# Patient Record
Sex: FEMALE | Race: WHITE | Employment: OTHER | URBAN - METROPOLITAN AREA
[De-identification: names, ages, dates, MRNs, and addresses within clinical notes are randomized per-mention and may not be internally consistent; named-entity substitution may affect disease eponyms.]

---

## 2017-02-10 ENCOUNTER — TELEPHONE (OUTPATIENT)
Dept: FAMILY MEDICINE CLINIC | Age: 52
End: 2017-02-10

## 2017-02-10 RX ORDER — PROMETHAZINE HYDROCHLORIDE AND CODEINE PHOSPHATE 6.25; 1 MG/5ML; MG/5ML
10 SYRUP ORAL 4 TIMES DAILY PRN
Qty: 150 ML | Refills: 0 | Status: SHIPPED | OUTPATIENT
Start: 2017-02-10 | End: 2017-03-15 | Stop reason: ALTCHOICE

## 2017-02-10 RX ORDER — AZITHROMYCIN 250 MG/1
TABLET, FILM COATED ORAL
Qty: 1 PACKET | Refills: 0 | Status: SHIPPED | OUTPATIENT
Start: 2017-02-10 | End: 2017-02-20

## 2017-09-05 ENCOUNTER — HOSPITAL ENCOUNTER (OUTPATIENT)
Dept: WOMENS IMAGING | Age: 52
Discharge: HOME | End: 2017-09-05
Payer: COMMERCIAL

## 2017-09-05 DIAGNOSIS — Z12.31 VISIT FOR SCREENING MAMMOGRAM: ICD-10-CM

## 2017-09-05 PROCEDURE — 77063 BREAST TOMOSYNTHESIS BI: CPT

## 2017-09-07 ENCOUNTER — OFFICE VISIT (OUTPATIENT)
Dept: FAMILY MEDICINE CLINIC | Age: 52
End: 2017-09-07
Payer: COMMERCIAL

## 2017-09-07 VITALS
BODY MASS INDEX: 30.37 KG/M2 | RESPIRATION RATE: 16 BRPM | DIASTOLIC BLOOD PRESSURE: 78 MMHG | TEMPERATURE: 98 F | SYSTOLIC BLOOD PRESSURE: 114 MMHG | WEIGHT: 189 LBS | HEIGHT: 66 IN | HEART RATE: 86 BPM | OXYGEN SATURATION: 97 %

## 2017-09-07 DIAGNOSIS — R14.0 BLOATING: ICD-10-CM

## 2017-09-07 DIAGNOSIS — R92.8 ABNORMAL MAMMOGRAM OF BOTH BREASTS: Primary | ICD-10-CM

## 2017-09-07 DIAGNOSIS — R53.83 TIRED: ICD-10-CM

## 2017-09-07 DIAGNOSIS — E03.4 HYPOTHYROIDISM DUE TO ACQUIRED ATROPHY OF THYROID: ICD-10-CM

## 2017-09-07 DIAGNOSIS — N95.1 MENOPAUSAL SYMPTOM: ICD-10-CM

## 2017-09-07 DIAGNOSIS — R79.89 PROLACTIN INCREASED: ICD-10-CM

## 2017-09-07 PROCEDURE — G8417 CALC BMI ABV UP PARAM F/U: HCPCS | Performed by: FAMILY MEDICINE

## 2017-09-07 PROCEDURE — 3017F COLORECTAL CA SCREEN DOC REV: CPT | Performed by: FAMILY MEDICINE

## 2017-09-07 PROCEDURE — 1036F TOBACCO NON-USER: CPT | Performed by: FAMILY MEDICINE

## 2017-09-07 PROCEDURE — 3014F SCREEN MAMMO DOC REV: CPT | Performed by: FAMILY MEDICINE

## 2017-09-07 PROCEDURE — G8427 DOCREV CUR MEDS BY ELIG CLIN: HCPCS | Performed by: FAMILY MEDICINE

## 2017-09-07 PROCEDURE — 99214 OFFICE O/P EST MOD 30 MIN: CPT | Performed by: FAMILY MEDICINE

## 2017-09-07 ASSESSMENT — PATIENT HEALTH QUESTIONNAIRE - PHQ9
2. FEELING DOWN, DEPRESSED OR HOPELESS: 0
1. LITTLE INTEREST OR PLEASURE IN DOING THINGS: 0
SUM OF ALL RESPONSES TO PHQ9 QUESTIONS 1 & 2: 0
SUM OF ALL RESPONSES TO PHQ QUESTIONS 1-9: 0

## 2017-09-08 ENCOUNTER — HOSPITAL ENCOUNTER (OUTPATIENT)
Age: 52
Discharge: HOME | End: 2017-09-08
Payer: COMMERCIAL

## 2017-09-08 ENCOUNTER — HOSPITAL ENCOUNTER (OUTPATIENT)
Dept: WOMENS IMAGING | Age: 52
Discharge: HOME | End: 2017-09-08
Payer: COMMERCIAL

## 2017-09-08 DIAGNOSIS — R14.0 BLOATING: ICD-10-CM

## 2017-09-08 DIAGNOSIS — E03.4 HYPOTHYROIDISM DUE TO ACQUIRED ATROPHY OF THYROID: ICD-10-CM

## 2017-09-08 DIAGNOSIS — R53.83 TIRED: ICD-10-CM

## 2017-09-08 DIAGNOSIS — R92.8 ABNORMAL MAMMOGRAM OF BOTH BREASTS: ICD-10-CM

## 2017-09-08 DIAGNOSIS — R79.89 PROLACTIN INCREASED: ICD-10-CM

## 2017-09-08 DIAGNOSIS — N63.0 LUMP, BREAST: ICD-10-CM

## 2017-09-08 DIAGNOSIS — N64.59 THICKENING OF SKIN OF BREAST: ICD-10-CM

## 2017-09-08 DIAGNOSIS — N95.1 MENOPAUSAL SYMPTOM: ICD-10-CM

## 2017-09-08 DIAGNOSIS — N64.9 ABNORMAL NIPPLE: ICD-10-CM

## 2017-09-08 LAB
ALBUMIN SERPL-MCNC: 4.8 G/DL (ref 3.5–5.1)
ALP BLD-CCNC: 96 U/L (ref 38–126)
ALT SERPL-CCNC: 26 U/L (ref 11–66)
ANION GAP SERPL CALCULATED.3IONS-SCNC: 12 MEQ/L (ref 8–16)
AST SERPL-CCNC: 14 U/L (ref 5–40)
AVERAGE GLUCOSE: 99 MG/DL (ref 70–126)
BASOPHILS # BLD: 1 %
BASOPHILS ABSOLUTE: 0.1 THOU/MM3 (ref 0–0.1)
BILIRUB SERPL-MCNC: 0.4 MG/DL (ref 0.3–1.2)
BILIRUBIN DIRECT: < 0.2 MG/DL (ref 0–0.3)
BUN BLDV-MCNC: 15 MG/DL (ref 7–22)
CALCIUM SERPL-MCNC: 10.3 MG/DL (ref 8.5–10.5)
CHLORIDE BLD-SCNC: 107 MEQ/L (ref 98–111)
CHOLESTEROL, TOTAL: 210 MG/DL (ref 100–199)
CO2: 24 MEQ/L (ref 23–33)
CREAT SERPL-MCNC: 0.7 MG/DL (ref 0.4–1.2)
EOSINOPHIL # BLD: 0.7 %
EOSINOPHILS ABSOLUTE: 0 THOU/MM3 (ref 0–0.4)
ESTRADIOL LEVEL: < 5 PG/ML
FOLLICLE STIMULATING HORMONE: 53 MIU/ML (ref 16–160)
GFR SERPL CREATININE-BSD FRML MDRD: 87 ML/MIN/1.73M2
GLUCOSE BLD-MCNC: 96 MG/DL (ref 70–108)
HBA1C MFR BLD: 5.3 % (ref 4.4–6.4)
HCT VFR BLD CALC: 42.8 % (ref 37–47)
HDLC SERPL-MCNC: 58 MG/DL
HEMOGLOBIN: 14.5 GM/DL (ref 12–16)
LDL CHOLESTEROL CALCULATED: 133 MG/DL
LUTEINIZING HORMONE: 18.8 MIU/ML (ref 3.3–70.6)
LYMPHOCYTES # BLD: 27.6 %
LYMPHOCYTES ABSOLUTE: 1.7 THOU/MM3 (ref 1–4.8)
MAGNESIUM: 1.9 MG/DL (ref 1.6–2.4)
MCH RBC QN AUTO: 30.3 PG (ref 27–31)
MCHC RBC AUTO-ENTMCNC: 33.9 GM/DL (ref 33–37)
MCV RBC AUTO: 89.4 FL (ref 81–99)
MONOCYTES # BLD: 6.6 %
MONOCYTES ABSOLUTE: 0.4 THOU/MM3 (ref 0.4–1.3)
NUCLEATED RED BLOOD CELLS: 0 /100 WBC
PDW BLD-RTO: 13.3 % (ref 11.5–14.5)
PLATELET # BLD: 288 THOU/MM3 (ref 130–400)
PMV BLD AUTO: 8.5 MCM (ref 7.4–10.4)
POTASSIUM SERPL-SCNC: 4 MEQ/L (ref 3.5–5.2)
PROGESTERONE LEVEL: < 0.05 NG/ML
PROLACTIN: 95.8 NG/ML
PTH INTACT: 20.6 PG/ML (ref 15–65)
RBC # BLD: 4.79 MILL/MM3 (ref 4.2–5.4)
RBC # BLD: NORMAL 10*6/UL
RHEUMATOID FACTOR: < 10 IU/ML (ref 0–13)
SEDIMENTATION RATE, ERYTHROCYTE: 12 MM/HR (ref 0–20)
SEG NEUTROPHILS: 64.1 %
SEGMENTED NEUTROPHILS ABSOLUTE COUNT: 3.9 THOU/MM3 (ref 1.8–7.7)
SODIUM BLD-SCNC: 143 MEQ/L (ref 135–145)
T3 TOTAL: 177 NG/DL (ref 72–181)
THYROXINE (T4): 7.1 UG/DL (ref 4.5–12)
TOTAL PROTEIN: 7.8 G/DL (ref 6.1–8)
TRIGL SERPL-MCNC: 93 MG/DL (ref 0–199)
TSH SERPL DL<=0.05 MIU/L-ACNC: 0.76 UIU/ML (ref 0.4–4.2)
URIC ACID: 4.8 MG/DL (ref 2.4–5.7)
VITAMIN D 25-HYDROXY: 82 NG/ML (ref 30–100)
WBC # BLD: 6.1 THOU/MM3 (ref 4.8–10.8)

## 2017-09-08 PROCEDURE — 84550 ASSAY OF BLOOD/URIC ACID: CPT

## 2017-09-08 PROCEDURE — 83002 ASSAY OF GONADOTROPIN (LH): CPT

## 2017-09-08 PROCEDURE — 86038 ANTINUCLEAR ANTIBODIES: CPT

## 2017-09-08 PROCEDURE — 82627 DEHYDROEPIANDROSTERONE: CPT

## 2017-09-08 PROCEDURE — 83090 ASSAY OF HOMOCYSTEINE: CPT

## 2017-09-08 PROCEDURE — 86430 RHEUMATOID FACTOR TEST QUAL: CPT

## 2017-09-08 PROCEDURE — 84270 ASSAY OF SEX HORMONE GLOBUL: CPT

## 2017-09-08 PROCEDURE — 36415 COLL VENOUS BLD VENIPUNCTURE: CPT

## 2017-09-08 PROCEDURE — 83735 ASSAY OF MAGNESIUM: CPT

## 2017-09-08 PROCEDURE — 83970 ASSAY OF PARATHORMONE: CPT

## 2017-09-08 PROCEDURE — 82670 ASSAY OF TOTAL ESTRADIOL: CPT

## 2017-09-08 PROCEDURE — 80053 COMPREHEN METABOLIC PANEL: CPT

## 2017-09-08 PROCEDURE — 86376 MICROSOMAL ANTIBODY EACH: CPT

## 2017-09-08 PROCEDURE — 82248 BILIRUBIN DIRECT: CPT

## 2017-09-08 PROCEDURE — 82306 VITAMIN D 25 HYDROXY: CPT

## 2017-09-08 PROCEDURE — 84436 ASSAY OF TOTAL THYROXINE: CPT

## 2017-09-08 PROCEDURE — 85025 COMPLETE CBC W/AUTO DIFF WBC: CPT

## 2017-09-08 PROCEDURE — 83036 HEMOGLOBIN GLYCOSYLATED A1C: CPT

## 2017-09-08 PROCEDURE — 84443 ASSAY THYROID STIM HORMONE: CPT

## 2017-09-08 PROCEDURE — 82626 DEHYDROEPIANDROSTERONE: CPT

## 2017-09-08 PROCEDURE — 85651 RBC SED RATE NONAUTOMATED: CPT

## 2017-09-08 PROCEDURE — 84146 ASSAY OF PROLACTIN: CPT

## 2017-09-08 PROCEDURE — 84144 ASSAY OF PROGESTERONE: CPT

## 2017-09-08 PROCEDURE — 84480 ASSAY TRIIODOTHYRONINE (T3): CPT

## 2017-09-08 PROCEDURE — 86141 C-REACTIVE PROTEIN HS: CPT

## 2017-09-08 PROCEDURE — 80061 LIPID PANEL: CPT

## 2017-09-08 PROCEDURE — 83516 IMMUNOASSAY NONANTIBODY: CPT

## 2017-09-08 PROCEDURE — 84403 ASSAY OF TOTAL TESTOSTERONE: CPT

## 2017-09-08 PROCEDURE — 76642 ULTRASOUND BREAST LIMITED: CPT

## 2017-09-08 PROCEDURE — 83001 ASSAY OF GONADOTROPIN (FSH): CPT

## 2017-09-09 LAB
C-REACTIVE PROTEIN, HIGH SENSITIVITY: 2.7 MG/L
DHEAS (DHEA SULFATE): 341 UG/DL (ref 26–200)
HOMOCYSTEINE, TOTAL: 8 UMOL/L
THYROID PEROXIDASE ANTIBODY: 0.4 IU/ML (ref 0–9)

## 2017-09-10 LAB
ANA SCREEN: NORMAL
GLIADIN PEPTIDE IGG, IGA: NORMAL

## 2017-09-11 ENCOUNTER — PATIENT MESSAGE (OUTPATIENT)
Dept: FAMILY MEDICINE CLINIC | Age: 52
End: 2017-09-11

## 2017-09-11 DIAGNOSIS — R92.8 ABNORMAL MAMMOGRAM OF BOTH BREASTS: Primary | ICD-10-CM

## 2017-09-12 LAB
DHEA UNCONJUGATED: 8.43 NG/ML (ref 0.63–4.7)
TESTOSTERONE, FREE W SHGB, FEMALES/CHILDREN: NORMAL

## 2017-09-18 ENCOUNTER — OFFICE VISIT (OUTPATIENT)
Dept: FAMILY MEDICINE CLINIC | Age: 52
End: 2017-09-18
Payer: COMMERCIAL

## 2017-09-18 VITALS
SYSTOLIC BLOOD PRESSURE: 136 MMHG | HEART RATE: 86 BPM | RESPIRATION RATE: 12 BRPM | HEIGHT: 67 IN | DIASTOLIC BLOOD PRESSURE: 82 MMHG | WEIGHT: 185 LBS | BODY MASS INDEX: 29.03 KG/M2 | OXYGEN SATURATION: 98 %

## 2017-09-18 DIAGNOSIS — R53.83 TIRED: ICD-10-CM

## 2017-09-18 DIAGNOSIS — R14.0 BLOATING: ICD-10-CM

## 2017-09-18 DIAGNOSIS — M54.40 ACUTE MIDLINE LOW BACK PAIN WITH SCIATICA, SCIATICA LATERALITY UNSPECIFIED: ICD-10-CM

## 2017-09-18 DIAGNOSIS — R63.5 WEIGHT INCREASE: ICD-10-CM

## 2017-09-18 DIAGNOSIS — E03.4 HYPOTHYROIDISM DUE TO ACQUIRED ATROPHY OF THYROID: ICD-10-CM

## 2017-09-18 DIAGNOSIS — H53.9 VISION CHANGES: Primary | ICD-10-CM

## 2017-09-18 DIAGNOSIS — R79.89 PROLACTIN INCREASED: ICD-10-CM

## 2017-09-18 DIAGNOSIS — R92.8 ABNORMAL MAMMOGRAM OF BOTH BREASTS: ICD-10-CM

## 2017-09-18 DIAGNOSIS — N95.1 MENOPAUSAL SYMPTOM: ICD-10-CM

## 2017-09-18 PROCEDURE — G8427 DOCREV CUR MEDS BY ELIG CLIN: HCPCS | Performed by: FAMILY MEDICINE

## 2017-09-18 PROCEDURE — 1036F TOBACCO NON-USER: CPT | Performed by: FAMILY MEDICINE

## 2017-09-18 PROCEDURE — 3014F SCREEN MAMMO DOC REV: CPT | Performed by: FAMILY MEDICINE

## 2017-09-18 PROCEDURE — 3017F COLORECTAL CA SCREEN DOC REV: CPT | Performed by: FAMILY MEDICINE

## 2017-09-18 PROCEDURE — G8417 CALC BMI ABV UP PARAM F/U: HCPCS | Performed by: FAMILY MEDICINE

## 2017-09-18 PROCEDURE — 99214 OFFICE O/P EST MOD 30 MIN: CPT | Performed by: FAMILY MEDICINE

## 2017-09-18 RX ORDER — MAGNESIUM GLUCONATE 27 MG(500)
500 TABLET ORAL 2 TIMES DAILY
COMMUNITY
End: 2020-06-18

## 2017-09-20 ENCOUNTER — HOSPITAL ENCOUNTER (OUTPATIENT)
Dept: CT IMAGING | Age: 52
Discharge: HOME | End: 2017-09-20
Payer: COMMERCIAL

## 2017-09-20 DIAGNOSIS — R79.89 PROLACTIN INCREASED: ICD-10-CM

## 2017-09-20 DIAGNOSIS — H53.9 VISION CHANGES: ICD-10-CM

## 2017-09-20 PROCEDURE — 70450 CT HEAD/BRAIN W/O DYE: CPT

## 2017-09-25 ENCOUNTER — OFFICE VISIT (OUTPATIENT)
Dept: ONCOLOGY | Age: 52
End: 2017-09-25
Payer: COMMERCIAL

## 2017-09-25 ENCOUNTER — HOSPITAL ENCOUNTER (OUTPATIENT)
Dept: INFUSION THERAPY | Age: 52
Discharge: HOME | End: 2017-09-25
Payer: COMMERCIAL

## 2017-09-25 VITALS
DIASTOLIC BLOOD PRESSURE: 86 MMHG | RESPIRATION RATE: 18 BRPM | HEIGHT: 67 IN | BODY MASS INDEX: 29.26 KG/M2 | SYSTOLIC BLOOD PRESSURE: 134 MMHG | WEIGHT: 186.4 LBS | OXYGEN SATURATION: 96 % | HEART RATE: 79 BPM | TEMPERATURE: 97.1 F

## 2017-09-25 DIAGNOSIS — R92.8 ABNORMAL MAMMOGRAM OF BOTH BREASTS: Primary | ICD-10-CM

## 2017-09-25 PROCEDURE — 99211 OFF/OP EST MAY X REQ PHY/QHP: CPT

## 2017-09-25 PROCEDURE — 99203 OFFICE O/P NEW LOW 30 MIN: CPT | Performed by: INTERNAL MEDICINE

## 2017-09-25 PROCEDURE — G8417 CALC BMI ABV UP PARAM F/U: HCPCS | Performed by: INTERNAL MEDICINE

## 2017-09-25 PROCEDURE — G8427 DOCREV CUR MEDS BY ELIG CLIN: HCPCS | Performed by: INTERNAL MEDICINE

## 2017-09-25 PROCEDURE — 3017F COLORECTAL CA SCREEN DOC REV: CPT | Performed by: INTERNAL MEDICINE

## 2017-09-25 PROCEDURE — 1036F TOBACCO NON-USER: CPT | Performed by: INTERNAL MEDICINE

## 2017-09-25 PROCEDURE — 3014F SCREEN MAMMO DOC REV: CPT | Performed by: INTERNAL MEDICINE

## 2017-09-25 NOTE — MR AVS SNAPSHOT
After Visit Summary             Win Humphrey   2017 2:30 PM   Office Visit    Description:  Female : 1965   Provider:  Omkar Choi MD   Department:  [facility] [address] [place] [address] and Future Appointments         Below is a list of your follow-up and future appointments. This may not be a complete list as you may have made appointments directly with providers that we are not aware of or your providers may have made some for you. Please call your providers to confirm appointments. It is important to keep your appointments. Please bring your current insurance card, photo ID, co-pay, and all medication bottles to your appointment. If self-pay, payment is expected at the time of service. Your To-Do List     Future Appointments Provider Department Dept Phone    10/27/2017 8:40 AM Larissa Mora MD [facility] [phone]    Please arrive 15 minutes prior to appointment, bring photo ID and insurance card. Please arrive 15 minutes prior to appointment, bring photo ID and insurance card. 3/27/2018 11:15 AM Omkar Choi MD [facility] [phone]    Please arrive 15 minutes prior to appointment, bring photo ID and insurance card. Please arrive 15 minutes prior to appointment, bring photo ID and insurance card. Follow-Up    Return in about 6 months (around 3/27/2018).          Information from Your Visit        Department     Name Address Phone Fax    [facility] [address] [place] [address] [address]  [address]  [facility] [phone]      Vital Signs     Blood Pressure Pulse Temperature Respirations Height Weight    134/86 (Site: Left Arm, Position: Sitting, Cuff Size: Medium Adult) 79 97.1 °F (36.2 °C) (Oral) 18 5' 6.53\" (1.69 m) 186 lb 6.4 oz (84.6 kg)    Oxygen Saturation Body Mass Index Smoking Status             96% 29.6 kg/m2 Former Smoker view the After Visit Summary. Additional Information  If you have questions, please contact the physician practice where you receive care. Remember, [employer] is NOT to be used for urgent needs. For medical emergencies, dial 911. For questions regarding your [employer] account call [phone]. If you have a clinical question, please call your doctor's office.

## 2017-09-25 NOTE — PROGRESS NOTES
[facility] PROFESSIONAL SERVS  [facility]  [address]  [address] [address]  [address] [address]  Dept: [phone]  Dept Fax: [phone]: [phone]     Visit Date: 9/25/2017     Stephanie Vizcaino is a 45 y.o. female who presents today for:   Chief Complaint   Patient presents with    Established New Doctor     Abnormal Mammogram        HPI:     This is a 66-year-old patient referred to medical oncology clinic to review and discuss findings from her mammogram.  Patient noticed some inversion of her left nipple associated with skin thickening around the left nipple. Therefore she had to the mammogram on September 5, 2017 that showed: There is some left nipple retraction and skin thickening at the    left nipple noted with no definite mammographic correlate. A    retroareolar left breast diagnostic ultrasound is recommended.     The mass in the left breast needs additional evaluation. On September 11, 2017 she had left a breast ultrasound that showed no sonographic correlate for nipple inversion. No definite skin thickening was seen on ultrasound. The overall report was read as BI-RADS Category 3, probably benign and six-month follow up of the left breast mammogram was recommended to show stability.       HPI   Past Medical History:   Diagnosis Date    ADHD (attention deficit hyperactivity disorder)     Hypothyroid 2006    Dr. Julius Mondragon Prolactin increased [facility]) 2006    Dr. Verner Seals      Past Surgical History:   Procedure Laterality Date   [address] then 1979    right then left      Family History   Problem Relation Age of Onset    Early Death Brother 39     suicide    Diabetes Maternal Grandmother       Social History   Substance Use Topics    Smoking status: Former Smoker     Years: 7.00     Types: Cigarettes    Smokeless tobacco: Never Used    Alcohol use No      Current Outpatient Prescriptions   Medication Sig Dispense Refill    magnesium gluconate (MAGONATE) 500 MG tablet Take 500 mg by mouth 2 times daily      B Complex-Folic Acid (BALANCED E-64 TR PO) Take 1 tablet by mouth 3 times daily (before meals) [facility] or Carlos at [facility] must be time released and not have Vit C in the pill      ascorbic acid (VITAMIN C) 1000 MG tablet Take 1,000 mg by mouth 4 times daily (before meals and nightly)      Lysine 500 MG TABS Take 1 tablet by mouth 4 times daily (before meals and nightly)      Menaquinone-7 (VITAMIN K2 PO) Take 1 capsule by mouth 2 times daily (before meals)      NATURE-THROID 65 MG tablet take 1 tablet by mouth once daily 30 tablet 11    b complex vitamins capsule Take 1 capsule by mouth daily B-12 plus by Delilah Shields at  [address] (OMEGA 3 500 PO) Take 1 capsule by mouth 4 times daily (before meals and nightly) [facility] # [id number]      Cholecalciferol (VITAMIN D PO) Take 5,000 Int'l Units by mouth daily       aspirin 325 MG EC tablet Take 325 mg by mouth daily      Cinnamon 500 MG CAPS Take 1 capsule by mouth 4 times daily (before meals and nightly) If want weight loss      Misc Natural Products (OSTEO BI-FLEX TRIPLE STRENGTH PO) Take 1 tablet by mouth daily        No current facility-administered medications for this visit. No Known Allergies   Health Maintenance   Topic Date Due    Hepatitis C screen  1965    HIV screen  03/11/1978    DTaP/Tdap/Td vaccine (1 - Tdap) 03/11/1982    Flu vaccine (1) 09/01/2017    Cervical cancer screen  03/16/2019    Breast cancer screen  09/05/2019    Lipid screen  09/08/2022    Colon cancer screen colonoscopy  03/16/2026        Subjective:   Review of Systems   Constitutional: Positive for fatigue. Negative for activity change, appetite change and fever. HENT: Negative for congestion, dental problem, facial swelling, hearing loss, mouth sores, nosebleeds, sore throat, tinnitus and trouble swallowing. Eyes: Positive for visual disturbance. Negative for discharge. Respiratory: Negative for cough, chest tightness, shortness of breath and wheezing. Cardiovascular: Negative for chest pain, palpitations and leg swelling. Gastrointestinal: Negative for abdominal distention, abdominal pain, blood in stool, constipation, diarrhea, nausea, rectal pain and vomiting. Endocrine: Negative for cold intolerance, polydipsia and polyuria. Genitourinary: Negative for decreased urine volume, difficulty urinating, dysuria, flank pain, hematuria and urgency. Musculoskeletal: Negative for arthralgias, back pain, gait problem, joint swelling, myalgias and neck stiffness. Skin: Negative for color change, rash and wound. Neurological: Positive for dizziness and headaches. Negative for tremors, seizures, speech difficulty, weakness, light-headedness and numbness. Hematological: Negative for adenopathy. Does not bruise/bleed easily. Psychiatric/Behavioral: Negative for confusion and sleep disturbance. The patient is nervous/anxious. Objective:   Physical Exam   Constitutional: She is oriented to person, place, and time. She appears well-developed and well-nourished. No distress. HENT:   Head: Normocephalic. Mouth/Throat: Oropharynx is clear and moist. No oropharyngeal exudate. Eyes: EOM are normal. Pupils are equal, round, and reactive to light. Right eye exhibits no discharge. Left eye exhibits no discharge. No scleral icterus. Neck: Normal range of motion. Neck supple. No JVD present. No tracheal deviation present. No thyromegaly present. Cardiovascular: Normal rate and normal heart sounds. Exam reveals no gallop and no friction rub. No murmur heard. Pulmonary/Chest: Effort normal and breath sounds normal. No stridor. No respiratory distress. She has no wheezes. She has no rales. She exhibits no tenderness. Left breast exhibits inverted nipple. Left breast exhibits no mass, no nipple discharge, no skin change and no tenderness. electronically signed by Dr. Shirley Laura on 9/21/2017 11:05 AM    [facility] Digital Screening Self Referral    Result Date: 9/5/2017  IMPRESSION: Mammogram BI-RADS: 0: Incomplete There is some left nipple retraction and skin thickening at the left nipple noted with no definite mammographic correlate. A retroareolar left breast diagnostic ultrasound is recommended. The mass in the left breast needs additional evaluation. The patient has been or will be contacted. #[id number] - [facility] DIGITAL SCREENING SELF REFERRAL BILATERAL DIGITAL SCREENING MAMMOGRAM 3D/2D WITH CAD: 9/5/2017 CLINICAL: Tomosynthesis. Self referred screening mammogram.  Comparison is made to exams dated:  3/22/2016 mammogram and 9/2/2005 mammogram - [address] [address]. The tissue of both breasts is heterogeneously dense. This may lower the sensitivity of mammography. Current study was also evaluated with a Computer Aided Detection (CAD) system. There is some left nipple retraction and skin thickening at the left nipple noted with no definite mammographic correlate. There is a mass in the left breast central to the nipple in the retroareolar region. No other significant masses, calcifications, or other findings are seen in either breast.  Courtney Tirvedi M.D., rd/penrad:9/5/2017 17:06:33  Imaging Technologist: Samir Salomon RT(R)(M), [address] [place] [address] letter sent: Additional Tests Needed  [id number]     Us Left Breast Limited    Result Date: 9/8/2017  IMPRESSION: Ultrasound BI-RADS: 3: Probably Benign There is no sonographic correlate for nipple inversion. No definite skin thickening is seen by ultrasound. Recommend 6 month  follow up left breast mammogram to document stability. A follow-up left mammogram in 6 months is recommended to demonstrate stability. The patient has been or will be contacted. #[id number] - US LEFT BREAST LIMITED ULTRASOUND OF LEFT BREAST AND LEFT AXILLA: 9/8/2017 CLINICAL: Lt breast nipple retraction. Left breast mass. Comparison is made to exams dated:  9/5/2017 mammogram and 3/22/2016 mammogram - [facility]. Ultrasound of the left breast and axilla was performed. Gray scale images of the real-time examination were reviewed. No abnormalities were seen sonographically in the left axilla. There are no discrete cystic or solid masses present to correspond to the nipple inversion. Silvio Coreas M.D.  rd/penrad:9/8/2017 11:22:35  copy to: Denise Higuera M.D., [address], ph: [phone], fax: [phone] Imaging Technologist: Stephania Haley RT(R)(M), [address] [address] letter sent: 6 Month Follow up Recommended     [id number]       Assessment/Plan:   1. Left breast and nipple inversion. The nipple inversion is very subtle on the physical examination. No drainage, no skin changes around the nipple. The patient had left breast mammogram and ultrasound they were read as BI-RADS Category 3. No sonographic correlate with the nipple inversion. Recommendation is to proceed with 6 months follow-up of the left breast mammogram.  It will be done in March 2018, I will see the patient again few days after mammogram.  She was instructed to call us if her nipple inversion is progressing and she develops some skin changes or swelling around the nipple. No diagnosis found. Plan:   No Follow-up on file. Orders Placed:   No orders of the defined types were placed in this encounter. Medications Prescribed:   No orders of the defined types were placed in this encounter. Discussed use, benefit, and side effects of prescribed medications. All patient questions answered. Pt voiced understanding. Instructed to continue current medications, diet and exercise. Patient agreed with treatment plan. Follow up as directed.     Electronically signed by Allan Eisenmenger, MD on 9/25/17 at 2:44 PM

## 2017-09-25 NOTE — PATIENT INSTRUCTIONS
1.  The patient will have 6 months follow-up breast  mammogram beginning of March 2018  2.    I should see her and March 2018

## 2017-10-06 DIAGNOSIS — E03.4 HYPOTHYROIDISM DUE TO ACQUIRED ATROPHY OF THYROID: ICD-10-CM

## 2017-10-06 DIAGNOSIS — R53.82 CHRONIC FATIGUE: ICD-10-CM

## 2017-10-09 RX ORDER — THYROID 60 MG/1
TABLET ORAL
Qty: 30 TABLET | Refills: 11 | Status: SHIPPED | OUTPATIENT
Start: 2017-10-09 | End: 2018-03-14 | Stop reason: RX

## 2017-10-16 ENCOUNTER — TELEPHONE (OUTPATIENT)
Dept: FAMILY MEDICINE CLINIC | Age: 52
End: 2017-10-16

## 2017-10-16 DIAGNOSIS — E03.4 HYPOTHYROIDISM DUE TO ACQUIRED ATROPHY OF THYROID: Primary | ICD-10-CM

## 2017-10-19 ASSESSMENT — ENCOUNTER SYMPTOMS
ABDOMINAL DISTENTION: 0
CHEST TIGHTNESS: 0
TROUBLE SWALLOWING: 0
ABDOMINAL PAIN: 0
SORE THROAT: 0
VOMITING: 0
DIARRHEA: 0
BLOOD IN STOOL: 0
SHORTNESS OF BREATH: 0
CONSTIPATION: 0
FACIAL SWELLING: 0
RECTAL PAIN: 0
COUGH: 0
EYE DISCHARGE: 0
NAUSEA: 0
BACK PAIN: 0
WHEEZING: 0
COLOR CHANGE: 0

## 2017-10-24 RX ORDER — LEVOTHYROXINE SODIUM 0.1 MG/1
100 TABLET ORAL DAILY
Qty: 30 TABLET | Refills: 1 | Status: SHIPPED | OUTPATIENT
Start: 2017-10-24 | End: 2018-01-16 | Stop reason: SDUPTHER

## 2018-01-16 DIAGNOSIS — E03.4 HYPOTHYROIDISM DUE TO ACQUIRED ATROPHY OF THYROID: ICD-10-CM

## 2018-01-16 RX ORDER — LEVOTHYROXINE SODIUM 0.1 MG/1
100 TABLET ORAL DAILY
Qty: 30 TABLET | Refills: 0 | Status: SHIPPED | OUTPATIENT
Start: 2018-01-16 | End: 2018-03-27 | Stop reason: ALTCHOICE

## 2018-03-08 ENCOUNTER — HOSPITAL ENCOUNTER (OUTPATIENT)
Age: 53
Discharge: HOME | End: 2018-03-08
Payer: COMMERCIAL

## 2018-03-08 DIAGNOSIS — E03.4 HYPOTHYROIDISM DUE TO ACQUIRED ATROPHY OF THYROID: ICD-10-CM

## 2018-03-08 LAB — TSH SERPL DL<=0.05 MIU/L-ACNC: 0.85 UIU/ML (ref 0.4–4.2)

## 2018-03-08 PROCEDURE — 84443 ASSAY THYROID STIM HORMONE: CPT

## 2018-03-08 PROCEDURE — 36415 COLL VENOUS BLD VENIPUNCTURE: CPT

## 2018-03-12 ENCOUNTER — TELEPHONE (OUTPATIENT)
Dept: FAMILY MEDICINE CLINIC | Age: 53
End: 2018-03-12

## 2018-03-12 NOTE — TELEPHONE ENCOUNTER
----- Message from Gifty Meléndez MD sent at 3/11/2018  4:50 PM EDT -----  Let her know the TSH was fine. She has not seen me since 2015. I would like to see her this month still.

## 2018-03-14 ENCOUNTER — OFFICE VISIT (OUTPATIENT)
Dept: FAMILY MEDICINE CLINIC | Age: 53
End: 2018-03-14

## 2018-03-14 VITALS
DIASTOLIC BLOOD PRESSURE: 60 MMHG | SYSTOLIC BLOOD PRESSURE: 104 MMHG | HEART RATE: 88 BPM | WEIGHT: 183.25 LBS | BODY MASS INDEX: 31.28 KG/M2 | HEIGHT: 64 IN | RESPIRATION RATE: 16 BRPM

## 2018-03-14 DIAGNOSIS — E03.4 HYPOTHYROIDISM DUE TO ACQUIRED ATROPHY OF THYROID: Primary | ICD-10-CM

## 2018-03-14 PROCEDURE — 99213 OFFICE O/P EST LOW 20 MIN: CPT | Performed by: FAMILY MEDICINE

## 2018-03-14 RX ORDER — LEVOTHYROXINE AND LIOTHYRONINE 38; 9 UG/1; UG/1
60 TABLET ORAL DAILY
Qty: 30 TABLET | Refills: 3 | Status: SHIPPED | OUTPATIENT
Start: 2018-03-14 | End: 2018-07-09 | Stop reason: SDUPTHER

## 2018-03-14 ASSESSMENT — ENCOUNTER SYMPTOMS
CONSTIPATION: 0
SINUS PRESSURE: 0
SHORTNESS OF BREATH: 0

## 2018-03-14 NOTE — PROGRESS NOTES
Subjective:      Patient ID: Sande Lanes is a 52 y.o. female. HPI  1. She wonders about armor thyroid. 2. She doesn't feel any energy on the levothyroxine. 3. She had some hot flashes  4. She was on ritalin in the past and wonders about it again. I asked her to wait till she's been on the armor for a time. Review of Systems   Constitutional: Positive for fatigue. HENT: Negative for sinus pressure. Eyes: Negative for visual disturbance. Respiratory: Negative for shortness of breath. Cardiovascular: Negative for chest pain. Gastrointestinal: Negative for constipation. Genitourinary: Negative. Hot flashes   Musculoskeletal: Negative for arthralgias. Skin: Negative for rash. Neurological: Positive for weakness. Negative for headaches. Psychiatric/Behavioral: Positive for sleep disturbance. The patient's medications, allergies, past medical problems, surgical, social, and family histories were reviewed and updated as needed. Objective:   Physical Exam   Constitutional: She is oriented to person, place, and time. She appears well-developed and well-nourished. No distress. HENT:   Head: Normocephalic and atraumatic. Right Ear: External ear normal.   Left Ear: External ear normal.   Nose: Nose normal.   Mouth/Throat: Oropharynx is clear and moist. No oropharyngeal exudate. Eyes: Conjunctivae are normal. No scleral icterus. Neck: Neck supple. Carotid bruit is not present. No tracheal deviation present. No thyromegaly present. Cardiovascular: Normal rate, regular rhythm, normal heart sounds and intact distal pulses. Pulmonary/Chest: Effort normal and breath sounds normal.   Abdominal: Soft. Bowel sounds are normal. She exhibits no mass. Musculoskeletal: She exhibits no edema. Lymphadenopathy:     She has no cervical adenopathy. Neurological: She is alert and oriented to person, place, and time. Skin: Skin is warm and dry.    Psychiatric: She has a normal mood

## 2018-03-26 ENCOUNTER — HOSPITAL ENCOUNTER (OUTPATIENT)
Dept: WOMENS IMAGING | Age: 53
Discharge: HOME | End: 2018-03-26
Payer: COMMERCIAL

## 2018-03-26 DIAGNOSIS — N64.9 ABNORMAL NIPPLE: ICD-10-CM

## 2018-03-26 DIAGNOSIS — Z09 FOLLOW UP: ICD-10-CM

## 2018-03-26 PROCEDURE — G0279 TOMOSYNTHESIS, MAMMO: HCPCS

## 2018-03-26 PROCEDURE — 76642 ULTRASOUND BREAST LIMITED: CPT

## 2018-03-27 ENCOUNTER — OFFICE VISIT (OUTPATIENT)
Dept: ONCOLOGY | Age: 53
End: 2018-03-27
Payer: COMMERCIAL

## 2018-03-27 ENCOUNTER — HOSPITAL ENCOUNTER (OUTPATIENT)
Dept: INFUSION THERAPY | Age: 53
Discharge: HOME | End: 2018-03-27
Payer: COMMERCIAL

## 2018-03-27 VITALS
HEART RATE: 85 BPM | TEMPERATURE: 98.7 F | OXYGEN SATURATION: 96 % | BODY MASS INDEX: 31.21 KG/M2 | DIASTOLIC BLOOD PRESSURE: 83 MMHG | WEIGHT: 182.8 LBS | RESPIRATION RATE: 16 BRPM | SYSTOLIC BLOOD PRESSURE: 141 MMHG | HEIGHT: 64 IN

## 2018-03-27 DIAGNOSIS — R92.8 ABNORMAL MAMMOGRAM OF BOTH BREASTS: Primary | ICD-10-CM

## 2018-03-27 PROCEDURE — 99213 OFFICE O/P EST LOW 20 MIN: CPT | Performed by: PHYSICIAN ASSISTANT

## 2018-03-27 PROCEDURE — 99211 OFF/OP EST MAY X REQ PHY/QHP: CPT

## 2018-03-28 ASSESSMENT — ENCOUNTER SYMPTOMS
CONSTIPATION: 0
RHINORRHEA: 0
CHEST TIGHTNESS: 0
VOMITING: 0
COUGH: 0
WHEEZING: 0
NAUSEA: 0
SINUS PAIN: 0
ABDOMINAL PAIN: 0
DIARRHEA: 0
SORE THROAT: 0
SHORTNESS OF BREATH: 0

## 2018-03-28 NOTE — PROGRESS NOTES
[facility] [address] [facility] [address], [address] [facility],[address]  [address] [address]  Dept: [phone]  Dept Fax: [phone]: [phone]      Visit Date: 3/27/2018     Mirna Bennett is a 52 y.o. female who presents today for:   Chief Complaint   Patient presents with    Follow-up     ABNORMAL MAMMO        HPI:   David Velázquez is a 52year old female who was referred to medical oncology to review and discuss findings from mammogram. The patient was evaluated by Dr. Therese Farr. Per Dr. Rosalva Small consult note: Patient noticed some inversion of her left nipple associated with skin thickening around the left nipple. Therefore she had the mammogram on September 5, 2017 that showed: There is some left nipple retraction and skin thickening at the    left nipple noted with no definite mammographic correlate. A    retroareolar left breast diagnostic ultrasound is recommended.     The mass in the left breast needs additional evaluation.       On September 11, 2017 she had left a breast ultrasound that showed no sonographic correlate for nipple inversion. No definite skin thickening was seen on ultrasound. The overall report was read as BI-RADS Category 3, probably benign and six-month follow up of the left breast mammogram was recommended to show stability. Interval History 3/27/2018: The patient presents for follow-up evaluation today. She had a diagnostic left mammogram completed 3/26/18 that revealed no mammographic abnormality to explain the patient's nipple inversion. An ultrasound of the left breast on 3/26/18 showed no sonographic correlate for nipple inversion, no evidence of malignancy. Ductal ectasia appears benign. Patient states since her last visit she has not developed new bumps or lumps in either breast. She has continued left nipple inversion which is associated with pain during different times of the month. She denies any redness or discharge.      HPI   Past Medical History:   Diagnosis Date    to exams dated:  9/5/2017 mammogram, 3/22/2016    mammogram, and 9/2/2005 mammogram - [facility].         The tissue of the left breast is heterogeneously dense. This may    lower the sensitivity of mammography.     Current study was also evaluated with a Computer Aided Detection    (CAD) system.     No mammographic explanation for the patient's nipple inversion.         No significant masses, calcifications, or other findings are seen    in the breast.       Left Breast Ultrasound  Narrative       IMPRESSION: Ultrasound BI-RADS: 2: Benign   There is no sonographic correlate for nipple inversion.     There is no sonographic evidence of malignancy.     The duct ectasia in the left breast appears benign.     Return to annual mammogram screening schedule is recommended.     The patient was notified of the results.         #[id number] - US BREAST LIMITED LEFT   ULTRASOUND OF LEFT BREAST: 3/26/2018   CLINICAL: Lt breast abnormal nipple.         Comparison is made to exams dated:  3/26/2018 mammogram, 9/8/2017    ultrasound, 9/5/2017 mammogram, and 3/22/2016 mammogram - [address]     Ultrasound of the left breast was performed.  Gray scale images    of the real-time examination were reviewed.     There is benign appearing duct ectasia in the left breast central    to the nipple anterior depth.  This correlates as an incidental    finding.     There are no discrete cystic or solid masses present to    correspond to the nipple inversion.         Dulce Mortensen M.D.     rd/penrad:3/26/2018 10:39:27              Assessment & Plan:   1. Left Breast Nipple Inversion   -Inversion is noted on physical examination, although is subtle. No drainage or skin changes are noted. Left Breast mammogram completed 3/26/18 showed no mammographic abnormality. Left breast ultrasound revealed benign appearing duct ectasia in the left breast central to nipple anterior depth. No masses to correspond. -Reviewed with Dr. Cheryle Salles, patient will return to routine screening mammogram in 6 months. This should be completed September 2018. Patient states this is already scheduled through [facility].    -Return to clinic in 6 months with Dr. Cheryle Salles after mammogram has been completed. -Patient was educated to call if worsening inversion develops, new skin changes or discharge. All patient questions answered. Pt voiced understanding. Patient agreed with treatment plan. Follow up as directed.     Electronically signed by   Sigrid Perez PA-C   [facility]

## 2019-03-27 ENCOUNTER — NURSE ONLY (OUTPATIENT)
Dept: LAB | Age: 54
End: 2019-03-27

## 2019-03-27 DIAGNOSIS — E03.4 HYPOTHYROIDISM DUE TO ACQUIRED ATROPHY OF THYROID: Primary | ICD-10-CM

## 2019-03-27 DIAGNOSIS — E03.4 HYPOTHYROIDISM DUE TO ACQUIRED ATROPHY OF THYROID: ICD-10-CM

## 2019-03-27 LAB — TSH SERPL DL<=0.05 MIU/L-ACNC: 2.76 UIU/ML (ref 0.4–4.2)

## 2019-03-28 ENCOUNTER — TELEPHONE (OUTPATIENT)
Dept: FAMILY MEDICINE CLINIC | Age: 54
End: 2019-03-28

## 2019-03-28 DIAGNOSIS — E03.4 HYPOTHYROIDISM DUE TO ACQUIRED ATROPHY OF THYROID: ICD-10-CM

## 2019-03-28 NOTE — TELEPHONE ENCOUNTER
----- Message from Cindy Howard MD sent at 3/28/2019 12:10 PM EDT -----  Let her know the TSH was fine.

## 2019-04-01 RX ORDER — LEVOTHYROXINE AND LIOTHYRONINE 38; 9 UG/1; UG/1
TABLET ORAL
Qty: 90 TABLET | Refills: 3 | Status: SHIPPED | OUTPATIENT
Start: 2019-04-01 | End: 2020-03-12

## 2020-02-25 ENCOUNTER — TELEPHONE (OUTPATIENT)
Dept: ONCOLOGY | Age: 55
End: 2020-02-25

## 2020-02-25 NOTE — TELEPHONE ENCOUNTER
Patient had called and asking to be seen for uncomfortable breast pain,which patient states comes and goes. Patient had self scheduled a mammogram for today but after the staff of [facility] got some history thought patient should be seen first in case a different mammo is needed. Patient was last seen on 3/27/18. Please advise and thanks.

## 2020-03-12 ENCOUNTER — OFFICE VISIT (OUTPATIENT)
Dept: ONCOLOGY | Age: 55
End: 2020-03-12
Payer: COMMERCIAL

## 2020-03-12 ENCOUNTER — HOSPITAL ENCOUNTER (OUTPATIENT)
Dept: INFUSION THERAPY | Age: 55
Discharge: HOME | End: 2020-03-12

## 2020-03-12 VITALS
TEMPERATURE: 98 F | SYSTOLIC BLOOD PRESSURE: 145 MMHG | WEIGHT: 186.8 LBS | OXYGEN SATURATION: 95 % | DIASTOLIC BLOOD PRESSURE: 79 MMHG | BODY MASS INDEX: 31.89 KG/M2 | HEART RATE: 79 BPM | RESPIRATION RATE: 16 BRPM | HEIGHT: 64 IN

## 2020-03-12 PROCEDURE — 99211 OFF/OP EST MAY X REQ PHY/QHP: CPT

## 2020-03-12 PROCEDURE — 99213 OFFICE O/P EST LOW 20 MIN: CPT | Performed by: INTERNAL MEDICINE

## 2020-03-12 ASSESSMENT — ENCOUNTER SYMPTOMS
VOMITING: 0
CONSTIPATION: 0
SORE THROAT: 0
RHINORRHEA: 0
DIARRHEA: 0
WHEEZING: 0
COUGH: 0
CHEST TIGHTNESS: 0
ABDOMINAL PAIN: 0
NAUSEA: 0
SINUS PAIN: 0
SHORTNESS OF BREATH: 0

## 2020-03-12 NOTE — PROGRESS NOTES
first      HPI   Past Medical History:   Diagnosis Date    ADHD (attention deficit hyperactivity disorder)     Hypothyroid 2006    Dr. Chen Terrell Prolactin increased [facility]) 2006    Dr. Nando Leos      Past Surgical History:   Procedure Laterality Date   [address] then 1979    right then left      Family History   Problem Relation Age of Onset    Early Death Brother 39        suicide    Diabetes Maternal Grandmother       Social History     Tobacco Use    Smoking status: Former Smoker     Years: 7.00     Types: Cigarettes    Smokeless tobacco: Never Used   Substance Use Topics    Alcohol use: No       Subjective:   Review of Systems   Constitutional: Negative for activity change, appetite change, chills, fatigue and fever. HENT: Negative for rhinorrhea, sinus pain and sore throat. Respiratory: Negative for cough, chest tightness, shortness of breath and wheezing. Cardiovascular: Negative for chest pain, palpitations and leg swelling. Gastrointestinal: Negative for abdominal pain, constipation, diarrhea, nausea and vomiting. Genitourinary: Negative for dysuria, frequency and hematuria. Musculoskeletal: Negative for arthralgias and myalgias. Skin: Negative for pallor and rash. Neurological: Negative for dizziness and headaches. Hematological: Negative for adenopathy. Psychiatric/Behavioral: The patient is not nervous/anxious. Objective:   Physical Exam   Constitutional: She is oriented to person, place, and time. She appears well-developed and well-nourished. No distress. HENT:   Head: Normocephalic and atraumatic. Mouth/Throat: Oropharynx is clear and moist. No oropharyngeal exudate. Eyes: Pupils are equal, round, and reactive to light. Conjunctivae are normal. No scleral icterus. Neck: Normal range of motion. Neck supple. No thyromegaly present.    Cardiovascular: Normal rate, regular rhythm, normal heart sounds and intact CLINICAL: Tomosynthesis. left breast nipple retraction.         Comparison is made to exams dated:  9/5/2017 mammogram, 3/22/2016    mammogram, and 9/2/2005 mammogram - [address]         The tissue of the left breast is heterogeneously dense. This may    lower the sensitivity of mammography.     Current study was also evaluated with a Computer Aided Detection    (CAD) system.     No mammographic explanation for the patient's nipple inversion.         No significant masses, calcifications, or other findings are seen    in the breast.       Left Breast Ultrasound  Narrative       IMPRESSION: Ultrasound BI-RADS: 2: Benign   There is no sonographic correlate for nipple inversion.     There is no sonographic evidence of malignancy.     The duct ectasia in the left breast appears benign.     Return to annual mammogram screening schedule is recommended.     The patient was notified of the results.         #[id number] - US BREAST LIMITED LEFT   ULTRASOUND OF LEFT BREAST: 3/26/2018   CLINICAL: Lt breast abnormal nipple.         Comparison is made to exams dated:  3/26/2018 mammogram, 9/8/2017    ultrasound, 9/5/2017 mammogram, and 3/22/2016 mammogram - [address]     Ultrasound of the left breast was performed.  Gray scale images    of the real-time examination were reviewed.     There is benign appearing duct ectasia in the left breast central    to the nipple anterior depth.  This correlates as an incidental    finding.     There are no discrete cystic or solid masses present to    correspond to the nipple inversion.         Ana Lancaster M.D.     rd/penrad:3/26/2018 10:39:27              Assessment & Plan:   1. History of left Breast Nipple Inversion   Left Breast mammogram completed 3/26/18 showed no mammographic abnormality. Left breast ultrasound revealed benign appearing duct ectasia in the left breast central to nipple anterior depth. No masses to correspond.   The patient was supposed to have annual screening mammogram in September 2018 and follow-up with us. However she did not have her mammogram and she did not show for follow-up visit with us. The patient complained of pain and fullness in her right breast.  Today's physical examination is without any abnormal findings. The patient is scheduled to have screening mammogram within the next week. All patient questions answered. Pt voiced understanding. Patient agreed with treatment plan. Follow up as directed.     Electronically signed by   Kristen Anderson MD   [facility]

## 2020-03-26 ENCOUNTER — HOSPITAL ENCOUNTER (OUTPATIENT)
Dept: WOMENS IMAGING | Age: 55
Discharge: HOME | End: 2020-03-26

## 2020-03-26 PROCEDURE — 77063 BREAST TOMOSYNTHESIS BI: CPT

## 2020-06-18 ENCOUNTER — OFFICE VISIT (OUTPATIENT)
Dept: FAMILY MEDICINE CLINIC | Age: 55
End: 2020-06-18

## 2020-06-18 ENCOUNTER — NURSE ONLY (OUTPATIENT)
Dept: LAB | Age: 55
End: 2020-06-18

## 2020-06-18 VITALS
WEIGHT: 181.38 LBS | RESPIRATION RATE: 12 BRPM | TEMPERATURE: 98 F | SYSTOLIC BLOOD PRESSURE: 128 MMHG | HEIGHT: 64 IN | HEART RATE: 68 BPM | BODY MASS INDEX: 30.96 KG/M2 | DIASTOLIC BLOOD PRESSURE: 70 MMHG

## 2020-06-18 PROBLEM — R79.89 INCREASED PROLACTIN LEVEL: Status: ACTIVE | Noted: 2017-09-07

## 2020-06-18 LAB — TSH SERPL DL<=0.05 MIU/L-ACNC: 0.53 UIU/ML (ref 0.4–4.2)

## 2020-06-18 PROCEDURE — 99214 OFFICE O/P EST MOD 30 MIN: CPT | Performed by: FAMILY MEDICINE

## 2020-06-18 RX ORDER — LEVOTHYROXINE AND LIOTHYRONINE 38; 9 UG/1; UG/1
TABLET ORAL
Qty: 30 TABLET | Refills: 11 | Status: CANCELLED | OUTPATIENT
Start: 2020-06-18

## 2020-06-18 ASSESSMENT — ENCOUNTER SYMPTOMS
CHEST TIGHTNESS: 1
SINUS PRESSURE: 0
CONSTIPATION: 0
SHORTNESS OF BREATH: 0

## 2020-06-19 ENCOUNTER — TELEPHONE (OUTPATIENT)
Dept: FAMILY MEDICINE CLINIC | Age: 55
End: 2020-06-19

## 2020-06-19 NOTE — TELEPHONE ENCOUNTER
----- Message from Lori Arechiga MD sent at 6/18/2020  9:50 PM EDT -----  Let her know the TSH was fine. Has she found where she would want the Rx to go?

## 2020-06-21 RX ORDER — LEVOTHYROXINE AND LIOTHYRONINE 38; 9 UG/1; UG/1
60 TABLET ORAL DAILY
Qty: 30 TABLET | Refills: 11 | Status: SHIPPED | OUTPATIENT
Start: 2020-06-21 | End: 2021-08-05

## 2021-03-10 ENCOUNTER — HOSPITAL ENCOUNTER (OUTPATIENT)
Dept: INFUSION THERAPY | Age: 56
Discharge: HOME | End: 2021-03-10
Payer: COMMERCIAL

## 2021-03-10 ENCOUNTER — OFFICE VISIT (OUTPATIENT)
Dept: ONCOLOGY | Age: 56
End: 2021-03-10
Payer: COMMERCIAL

## 2021-03-10 VITALS
OXYGEN SATURATION: 97 % | WEIGHT: 177.8 LBS | BODY MASS INDEX: 29.62 KG/M2 | RESPIRATION RATE: 16 BRPM | HEIGHT: 65 IN | TEMPERATURE: 98 F | SYSTOLIC BLOOD PRESSURE: 166 MMHG | HEART RATE: 82 BPM | DIASTOLIC BLOOD PRESSURE: 82 MMHG

## 2021-03-10 DIAGNOSIS — N64.59 INVERSION OF LEFT NIPPLE: ICD-10-CM

## 2021-03-10 DIAGNOSIS — Z12.31 VISIT FOR SCREENING MAMMOGRAM: Primary | ICD-10-CM

## 2021-03-10 PROCEDURE — 99213 OFFICE O/P EST LOW 20 MIN: CPT | Performed by: PHYSICIAN ASSISTANT

## 2021-03-10 PROCEDURE — 99211 OFF/OP EST MAY X REQ PHY/QHP: CPT

## 2021-03-10 NOTE — PROGRESS NOTES
annual screening mammogram later this month. Patient states she has been feeling well over the last year. She denies any changes in her overall health. She denies ED visits or hospitalizations. She denies any changes in her breasts. She continues to complete monthly self breast exams and denies any pain, nipple discharge or skin changes. She has chronic left nipple inversion which is unchanged in the last year. PMH, SH, and FH:  I reviewed the patients medication list and allergy list as noted on the electronic medical record. The PMH, SH and FH were also reviewed as noted on the EMR. Review of Systems:   Review of Systems   Pertinent review of systems noted in HPI, all other ROS negative. Objective:   Physical Exam   BP (!) 166/82 (Site: Left Upper Arm, Position: Sitting, Cuff Size: Medium Adult)   Pulse 82   Temp 98 °F (36.7 °C) (Infrared)   Resp 16   Ht 5' 5\" (1.651 m)   Wt 177 lb 12.8 oz (80.6 kg)   SpO2 97%   BMI 29.59 kg/m²    General appearance: No apparent distress, well developed and cooperative. HEENT: Pupils equal, round, and reactive to light. Conjunctivae/corneas clear. Neck: Supple, with full range of motion. Trachea midline. Respiratory:  Normal respiratory effort. Chest: left breast with nipple inversion noted, no palpable abnormalities. No discharge present. Abdomen: Soft, non-distended. Musculoskeletal: No clubbing, cyanosis or edema bilaterally. Ambulates in office without difficulty. Skin: Skin color, texture, turgor normal.  No visible rashes or lesions. Neurologic:  Neurovascularly intact without any focal sensory/motor deficits. Psychiatric: Alert and oriented      Imaging Studies and Labs:   Mammogram 3/26/2020  Narrative       IMPRESSION: Mammogram BI-RADS: 2: Benign           1. There is stable appearing left nipple inversion. There is no    mammographic evidence of malignancy.        A 1 year screening mammogram is recommended. Assessment and Plan:   1. History of Left Breast Nipple Inversion   Left Breast mammogram completed 3/26/18 showed no mammographic abnormality. Left breast ultrasound revealed benign appearing duct ectasia in the left breast central to nipple anterior depth. No masses to correspond. Annual mammogram completed on 3/26/2020 showed stable appearing left nipple inversion, no mammographic evidence of malignancy. She denies any changes over the last year. Breast examination completed today showed no abnormal findings with chronic left nipple inversion.    -Will schedule for screening mammogram to be completed later this month   -Patient instructed to continue self breast exams   -Instructed to call with any breast changes or concerns      Return in about 1 year (around 3/10/2022). All patient questions answered. Pt voiced understanding. Patient agreed with treatment plan. Follow up as directed. Patient instructed to call for questions or concerns.           Electronically signed by   Lucina Christianson PA-C

## 2021-03-10 NOTE — PATIENT INSTRUCTIONS
1. Schedule mammogram to be completed after 3/26/2021.  2. Return to clinic in one year  3. Please call for questions or concerns.

## 2021-06-01 ENCOUNTER — HOSPITAL ENCOUNTER (OUTPATIENT)
Dept: WOMENS IMAGING | Age: 56
Discharge: HOME | End: 2021-06-01
Payer: COMMERCIAL

## 2021-06-01 DIAGNOSIS — Z12.31 VISIT FOR SCREENING MAMMOGRAM: ICD-10-CM

## 2021-06-01 PROCEDURE — 77063 BREAST TOMOSYNTHESIS BI: CPT

## 2021-06-23 ENCOUNTER — TELEPHONE (OUTPATIENT)
Dept: FAMILY MEDICINE CLINIC | Age: 56
End: 2021-06-23

## 2021-06-23 NOTE — TELEPHONE ENCOUNTER
NEEDS A f/u appt, not seen since 2017, needs to be a new pt.   Told her to call and we can get her in quicker since local.

## 2021-07-07 ENCOUNTER — OFFICE VISIT (OUTPATIENT)
Dept: FAMILY MEDICINE CLINIC | Age: 56
End: 2021-07-07
Payer: COMMERCIAL

## 2021-07-07 VITALS
SYSTOLIC BLOOD PRESSURE: 132 MMHG | HEART RATE: 70 BPM | HEIGHT: 65 IN | RESPIRATION RATE: 12 BRPM | DIASTOLIC BLOOD PRESSURE: 70 MMHG | BODY MASS INDEX: 28.59 KG/M2 | TEMPERATURE: 97.5 F | WEIGHT: 171.6 LBS | OXYGEN SATURATION: 98 %

## 2021-07-07 DIAGNOSIS — R63.5 WEIGHT INCREASE: ICD-10-CM

## 2021-07-07 DIAGNOSIS — R14.0 BLOATING: ICD-10-CM

## 2021-07-07 DIAGNOSIS — E06.3 HYPOTHYROIDISM DUE TO HASHIMOTO'S THYROIDITIS: ICD-10-CM

## 2021-07-07 DIAGNOSIS — R53.83 TIRED: Primary | ICD-10-CM

## 2021-07-07 DIAGNOSIS — N95.1 MENOPAUSAL SYMPTOM: ICD-10-CM

## 2021-07-07 DIAGNOSIS — E03.4 HYPOTHYROIDISM DUE TO ACQUIRED ATROPHY OF THYROID: ICD-10-CM

## 2021-07-07 DIAGNOSIS — R41.3 MEMORY DIFFICULTIES: ICD-10-CM

## 2021-07-07 DIAGNOSIS — E03.8 HYPOTHYROIDISM DUE TO HASHIMOTO'S THYROIDITIS: ICD-10-CM

## 2021-07-07 PROCEDURE — 99203 OFFICE O/P NEW LOW 30 MIN: CPT | Performed by: FAMILY MEDICINE

## 2021-07-07 SDOH — ECONOMIC STABILITY: FOOD INSECURITY: WITHIN THE PAST 12 MONTHS, THE FOOD YOU BOUGHT JUST DIDN'T LAST AND YOU DIDN'T HAVE MONEY TO GET MORE.: NEVER TRUE

## 2021-07-07 SDOH — ECONOMIC STABILITY: FOOD INSECURITY: WITHIN THE PAST 12 MONTHS, YOU WORRIED THAT YOUR FOOD WOULD RUN OUT BEFORE YOU GOT MONEY TO BUY MORE.: NEVER TRUE

## 2021-07-07 ASSESSMENT — PATIENT HEALTH QUESTIONNAIRE - PHQ9
SUM OF ALL RESPONSES TO PHQ QUESTIONS 1-9: 0
SUM OF ALL RESPONSES TO PHQ9 QUESTIONS 1 & 2: 0
1. LITTLE INTEREST OR PLEASURE IN DOING THINGS: 0
SUM OF ALL RESPONSES TO PHQ QUESTIONS 1-9: 0
SUM OF ALL RESPONSES TO PHQ QUESTIONS 1-9: 0
2. FEELING DOWN, DEPRESSED OR HOPELESS: 0

## 2021-07-07 ASSESSMENT — SOCIAL DETERMINANTS OF HEALTH (SDOH): HOW HARD IS IT FOR YOU TO PAY FOR THE VERY BASICS LIKE FOOD, HOUSING, MEDICAL CARE, AND HEATING?: NOT HARD AT ALL

## 2021-07-07 NOTE — PROGRESS NOTES
[id number] [facility] Gutierrez LANDON [address] [address]  Dept: [phone]  Dept Fax: [phone]  Loc: [phone]      Riley Mejia is a 60 y.o. White female. Teofilo Smith  presents to the [place] [address] clinic today for   Chief Complaint   Patient presents with    Established New Doctor     FORMER PT, RE ESTABLISH   , and;   No diagnosis found. I have reviewed Riley Mejia medical, surgical and other pertinent history in detail, and have updated medication and allergy information in the computerized patient record. Clinical Care Team:     -Referring Provider for today's consult: self  -Primary Care Provider: Gem Doe MD    Medical/Surgical History:   She  has a past medical history of ADHD (attention deficit hyperactivity disorder), Hypothyroid, and Prolactin increased. Her  has a past surgical history that includes Tonsillectomy and adenoidectomy (1974) and Tympanoplasty (1974 then 1979). Family/Social History:     Her family history includes Diabetes in her maternal grandmother; Early Death (age of onset: 39) in her brother. She  reports that she quit smoking about 31 years ago. Her smoking use included cigarettes. She started smoking about 36 years ago. She has a 20.00 pack-year smoking history. She has never used smokeless tobacco. She reports previous alcohol use. She reports that she does not use drugs. Medications/Allergies/Immunizations:     Her current medication(s) include   Current Outpatient Medications:     thyroid (ARMOUR THYROID) 60 MG tablet, Take 1 tablet by mouth daily, Disp: 30 tablet, Rfl: 11  Allergies: Patient has no known allergies. Immunizations: There is no immunization history on file for this patient. History of Present Illness:     Ashish had concerns including Established New Doctor (FORMER PT, RE ESTABLISH). Sonali Burroughs  presents to the [facility] today for;   Chief Complaint   Patient presents with    Established New Doctor     FORMER PTMEET   , abnormal labs follow up and these conditions as she  Is looking today for:     No diagnosis found. HPI    Subjective:     Review of Systems   Constitutional: Positive for fatigue. All other systems reviewed and are negative. Objective:     /70 (Site: Left Upper Arm, Position: Sitting, Cuff Size: Medium Adult)   Pulse 70   Temp 97.5 °F (36.4 °C) (Oral)   Resp 12   Ht 5' 5\" (1.651 m)   Wt 171 lb 9.6 oz (77.8 kg)   SpO2 98%   BMI 28.56 kg/m²   Physical Exam  Vitals and nursing note reviewed. Constitutional:       Appearance: Normal appearance. HENT:      Head: Normocephalic. Pulmonary:      Effort: Pulmonary effort is normal.   Neurological:      Mental Status: She is alert. Psychiatric:         Mood and Affect: Mood normal.         Thought Content: Thought content normal.            Laboratory Data:   Lab results were searched in Care Everywhere and/or those brought by the pateint were reviewed today with Cindy Little and she has a copy of their most recent labs to take home with them as noted below;       Imaging Data:   Imaging Data:       Assessment & Plan:       Impression:  No diagnosis found. Assessment and Plan:  After reviewing the patients chief complaints, reviewing their labfindings in great detail (with the patient and those accompanying them) which correlate to their chief complaints, symptoms, and or medical conditions; suggestions were made relating to changes in diet and or supplements which may improve the complaints and which will be reflected in their future lab findings;   Chief Complaint   Patient presents with    Established New Doctor     MEET ESQUIVEL PT   ;    Plans for the next visits:  - Abnormal and non-optimal Labs were ordered today to be repeated in the next 120-365 days to assess changes from adjustments in nutrition and or nutrients. - Patient instructed when having a blood draw to ask the  to divide their lab draws into multiple draws over several days if not feeling good at the time of the lab draw or if either prefers to do several smaller blood draws over several days  -Patient instructed to check with insurer before each lab draw and to go to the lab which the insurer directs them for the most cost effective lab draw with the least patient's cost  - Russ Ornelas  will be scheduled subsequent to those results. Yrntre Scott will bring in her drink, food, supplement log to her next visit    Chronic Problems Addressed on this Visit:                                   1.  Intensity of Service; Uncontrolled items at this visit; Chief Complaint   Patient presents with    Established New Doctor     FORMER PT, RE ESTABLISH   ; Improved items at this visit and Stable items were discussed at this visit;  2. Patients food, drinks, supplements and symptoms were reviewed with the patient,       - Russ Ornelas will bring food, drink, supplements and symptoms log to next visit for inclusion in their record      - 75 better food list reviewed & given to patient with the omega 6 food list to avoid      - The 52 Latex foods list was reviewed and given to the patients with the information on carrageenan         - Gluten in corn and oats abstracts sheet reviewed and given to the patient today   3.    Greater than 15 minutes time was spent with the patient face to face on this visit; of which >50% was for counseling and coordination of care, as well as the time spent before and after the visit reviewing the chart, documenting the encounter, reviewing labs,reports, NIH listed studies, making phone calls, etc.      Patients food and drinks were reviewed with the patient,   - They will bring a food drink symptom log to future visits for inclusion in their record    - 75 better food list reviewed & given to patient along with the omega 6 food list to avoid      - Gluten in corn and oats abstracts sheet reviewed and given to the patient today    - 23 Foods containing Latex-like proteins was reviewed and copy to be taken if desired     - Nutrient Supplements list provided and copyto be taken if desired    - [employer]. [employer] web site offered to patient to review at their convenience by staff with login information    Note:  I have discussed with the patient that with all nutraceuticals, there is often mixed data and emerging research which needs to be monitored; as well as an array of NIH fact sheets on nutrients and supplements, available at www.nih,issue plus Marco Vasco. [employer] plus www.Razient,org. If I have recommended cinnamon at the request of this patient to assist them in control of their blood sugar, triglyceride, and/or weight issues. I discussed that the patient's clinical use of cinnamon bark, calcium, magnesium, Vitamin D, and pharmaceutical grade CVS omega 3 oil or triple-strength fish oil, and B-50/B-100 time-released B-complex by [address] [facility] will be for a time-limited trial to determine their individual effectiveness and safety in this patient. I also referred the patient to the NMCD: Nutrition, Metabolism, and Cardiovascular Diseases (SecuritiesCard.pl) and concerns about long-term use and hepatotoxicity of cinnamon and other nutrients. I suggested they frequently search nih.gov for the latest non-proprietary information on nutriceuticals as well as consider a subscription to "[employer] for details on reviewed supplements, or at the least review the nutrient files at [facility], [address] bark, an insulin mimetic, reduces some High Carbohydrate Dietary Impacts. Methylhydroxychalcone polymers insulin-enhancing properties in fat cells are responsible for enhanced glucose uptake, inhibiting hepatic HMG-CoA reductase and lowers lipids. www.jacn. org/content/20/4/327.full     But cinnamon with additives such as Cinnamon Extract are not effective as insulin mimetics.  :eStoreDirectory.at     Nutrients for Start up from [employer] or [employer] for ease to get started now;  Sadie Wilson has some useable products;  - Triple Strength Fish Oil, enteric coated  - Vit D-3 5000 IU gel caps  - Iron ferrous sulfate 325 mg tabs  - Centrum Silver look-a-like for most patients, or  - Centrum plain look-a-like if need iron    Local pharmacies or chains such as [employer], have:  - [employer] pharmaceutical grade omega 3 is 90% EPA/DHA whereas most Triple strength fish oil are 75% EPA/DHA  - Triple Strength Fish Oil (enteric coated if available) or if not enteric coated, can take from freezer for less burps  - B-50 or B-100 released balanced B complex tabs by [address] [address] [facility] bark 500 mg (without Chromium or extracts)   some brands list 1000 mg / serving of 2 capsules,    some brands have 1000 mg caps with the undesireable chromium extract  - Calcium carbonate/citrate, magnesium oxide/citrate, Vit D-3 as 3-4 tabs/caps/serving     Some Local Brands may contain Zinc which is acceptable for the first bottle or two  - Magnesium oxide 250 mg tabs for those having < 2 bowel movements daily  - Magnesium citrate 200 mg if having > 2 bowel movements/day  - Centrum Silver or look-a-like for most patients, Centrum plain or look-a-like with iron  - Vitamin D-3 comes as 1,000 IU or 2,000 IU or 5,000 IU gel caps or Liquid drops but keep Vitamin D levels <50 but >40     Some brands containing or derived from soy oil or corn oil are OK if not allergic to soy  - Elemental Iron 65 mg tabs at bedtime is available over the counter if need more iron     Usually turns bowel movements grey, green, or black but not a concern  - Apricot Kernel Oil (by Now) for dry skin sensitive perineal or perianal area skin    Nutrients for ongoing use by Mail order for less expense from wwwLXSN ;  - Strength Fish Oil , 240 Softgels Item #184092  -B-100 time released balanced B complex Item #[id number]  - Cinnamon bark 500 mg without Chromium or extract Item #012652  - Calcium carbonate 1000 mg, Magnesium oxide 500 mg, Vit D-3 400 IU Item #258080  - Magnesium oxide 500 mg tabs Item #511988 if less than 2 bowel movements daily  - [employer] Sen[employer] Item #687918 for most patients, One Daily Item #819914 with iron  - Vit D 3  1,000 Item #793827      2,000 IU Item #354758   Item #294456     Some brands containing orderived from soy oil or corn oil are OK if not allergic to soy    Nutrients for Special Needs by Mail order for less expense from www. puritan.com;  -Elemental Iron 65 mg tabs Item #719317 if need more iron for low iron on labs    Usually turns bowel movements grey, green or black but not a concern  - Time released Niacin 250 mg Item #934837 for cold intolerance, low libido or impotence  - DHEA 50 mg Item #858954 for improving DHEA levels on labs if having Fatigue    If stools too loose substitute for your Magnesium oxide using;   Magnesium citrate 200 mg tabs (NOT liquid) at [employer]   Magnesium gluconate 550 mg by Lin James at [employer] or K[employer]. com  Magnesium chloride foot soaks or body sprays  www.Velocomp   Magnesium chloride flakes 14.99 Item #: [id number] if back-ordered, get spray  Magnesium threonate, Magtein also helps mental clarity and sleep    Food Drink Symptom Log;  I asked this patient to track these items and any other symptoms on their list on a weekly basis to documenttheir progress or lack of same.  This can be done on the symptom tracking sheet I gave them at today's visit but looks like this:                                                      Rate on scale of 0-10 with zero = not noticeable  Symptom:                            Week 1               2                 3                 4               Etc Hair loss    Foot cramps    Paresthesia    Aches    IBS (irritable bowel)    Constipation    Diarrhea  Nocturia (up to bathroom at night)    Fatigue/Energy level  Stress      On the other side of the sheet they can track their food, drink, environment, activity, symptoms etc      Avoiding Latex-like proteins in my foods; Avocados, Bananas, Celery, Figs & Kiwi proteins have latex-like proteins to inflame our immune systems, plus 47 more foods  How Can I Have A Latex Allergy? Eating foods with latex-like protein exposes us to latex allergies. Our body cannot tell the differencebetween these latex-like proteins and latex from rubber products since many people are allergic to fruit, vegetables and latex. Read labels on pre-packaged foods. This list to avoid is only a guide if you are known allergicto latex or have a latex rash on your chin, cheeks and lines on your neck and chest. The amount of latex is different in each food product or fruit variety. Avoid out of Season if not grown locally:   Melon, Nectarine, Papaya, Cherry, Passion fruit, Plum, Chestnuts, and Tomato. Avocado, Banana, Celery, Figs, and Kiwi always contain Latex-like protein. Whats in Season? Strawberries taste better in June than December because June is strawberry season so buy locally grown produce \"in season\" for the best flavor, cost, and less Latex. Locally grown produce not only tastes great but also requires little or no ethylene exposure in food distribution so has less latex content. Out of season: use canned, frozen, or dried since those are processed ripe and latex content is lower!!!     Month     [place] Locally Grown Produce  January, February, March: use canned, frozen or dried fruits since lower in latex  April: asparagus, radishes  May: asparagus, broccoli, green onions, greens, peas, radishes, rhubarb  June: asparagus, beets, beans, broccoli, cabbage, cantaloupe, carrots, green onions, greens, lettuce, onions, parsley, peas, radishes, rhubarb, strawberries, watermelons  July: beans, beets, blueberries, broccoli, cabbage, cantaloupe, carrots, cauliflower, celery, cucumbers, eggplant, grapes, green onions, greens, lettuce, onions, parsley, peas, peaches, bell peppers, potatoes, radishes, summer raspberries, squash, sweetcorn, tomatoes, turnips, watermelons  August: apples, beans, beets, blueberries, cabbage, cantaloupe, carrots, cauliflower, celery, cucumbers, eggplant, grapes, green onions, greens, lettuce, onions, parsley, peas, peaches, pears, bell peppers, potatoes, radishes, squash, sweet corn, tomatoes, turnips, watermelons  September: apples, beans, beets, blueberries, cabbage, cantaloupe, carrots, cauliflower, celery, cucumbers, eggplant, grapes, green onions, greens, lettuce, onions, parsley, peas, peaches, pears, bell peppers, plums, potatoes, pumpkins, radishes, fall red raspberries, squash, sweet corn, tomatoes, turnips, watermelons  October: apples, beets, broccoli, cabbage, carrots, cauliflower, celery, green onions, greens, lettuce, parsley, peas, pears, potatoes, pumpkins, radishes, fall red raspberries, squash, turnips  November: broccoli, cabbage, carrots, parsley, pears, peas  December: use canned, frozen or dried fruits since lower in latex    Upto half of latex-sensitive patients show allergic reactions to fruits (avocados, bananas, kiwifruits, papayas, peaches),   Annals of Allergy, 1994. These plants contain the same proteins that are allergens in latex. People with fruit allergies should warn physicians before undergoing procedures which may cause anaphylactic reaction if in contact with latex gloves. Some of the common foods with defined cross-reactivity to latex are avocado, banana, kiwi, chestnut, raw potato, tomato, stone fruits (e.g., peach, cherry), hazelnut, melons, celery, carrot, apple, pear, papaya, and almond.   Foods with less well-defined cross-reactivity to latex are peanuts, peppers, citrus fruits, coconut, pineapple, joseph, fig, passion fruit, Ugli fruit, and grape. This fruit/latex cross-reactivity is worsened by ethylene, a gas used to hasten commercial ripening. In nature, plants produce low levels of the hormone ethylene, which regulates germination, flowering, and ripening. Forced ripening by high ethylene concentrations, plants produce allergenic wound-repair proteins, which are similar to wound-repair proteins made during the tapping of rubber trees. Sensitive individuals who ingest the fruit get a higher dose and worse reaction. Some people may even first become sensitized to latex through fruit. Can food processing increase the concentrations of allergenic proteins? Latex-sensitized children (and adults) in [place] often experience allergic reactions after eating bananas ripened artificially with ethylene. In the [place], food distribution centers treat unripe bananas and other produce with ethylene to ripen; not commonly done in [facility] since fruit is tree-ripened there. Does treatment of food with ethylene induce banana proteins that cross-react with latex? (Adam et al.)    References:   Latex in Foods Allergy, http://ehp.niehs.nih.gov/members/2003/5811/5811.html    Search web for [employer] in Season \" for where you live or are at the time you food shop   Management of Latex, ://medicalcenter. o[employer].edu/  search for nih, latex-like proteins in foods

## 2021-07-12 LAB
ABSOLUTE BASO #: 0 X10E9/L (ref 0–0.2)
ABSOLUTE EOS #: 0.1 X10E9/L (ref 0–0.4)
ABSOLUTE LYMPH #: 1.7 X10E9/L (ref 1–3.5)
ABSOLUTE MONO #: 0.5 X10E9/L (ref 0–0.9)
ABSOLUTE NEUT #: 2.3 X10E9/L (ref 1.5–6.6)
ALBUMIN SERPL-MCNC: 4.5 G/DL (ref 3.2–5.3)
ALBUMIN SERUM: 4.4 G/DL (ref 3.6–5.1)
ALK PHOSPHATASE: 93 U/L (ref 39–130)
ALT SERPL-CCNC: 38 U/L (ref 0–31)
AMYLASE: 66 U/L (ref 28–100)
ANION GAP SERPL CALCULATED.3IONS-SCNC: 7 MMOL/L (ref 5–15)
ANTI-NUCLEAR ANTIBODY (ANA): NEGATIVE
ANTI-THYROGLOB ABS: <1 IU/ML
AST SERPL-CCNC: 16 U/L (ref 0–41)
AVERAGE GLUCOSE: 111 MG/DL
BASOPHILS RELATIVE PERCENT: 0.7 %
BILIRUB SERPL-MCNC: 0.4 MG/DL (ref 0.3–1.2)
BILIRUBIN DIRECT: 0 MG/DL (ref 0–0.4)
BUN BLDV-MCNC: 11 MG/DL (ref 5–23)
CALCIUM SERPL-MCNC: 9.7 MG/DL (ref 8.5–10.5)
CHLORIDE BLD-SCNC: 107 MMOL/L (ref 98–109)
CHOLESTEROL/HDL RATIO: 4.2 (ref 1–5)
CHOLESTEROL: 215 MG/DL (ref 150–200)
CO2: 30 MMOL/L (ref 22–32)
CREAT SERPL-MCNC: 0.69 MG/DL (ref 0.4–1)
DEHYDROEPIANDROSTERONE: 10.4 NG/ML (ref 1.3–9.8)
DHEAS (DHEA SULFATE): 194 UG/DL (ref 8–188)
EGFR AFRICAN AMERICAN: >60 ML/MIN/1.73SQ.M
EGFR IF NONAFRICAN AMERICAN: >60 ML/MIN/1.73SQ.M
EOSINOPHILS RELATIVE PERCENT: 1.6 %
ESTRADIOL LEVEL: <15 PG/ML
FOLLICLE STIMULATING HORMONE: 62.7 MIU/ML
GLIADIN ANTIBODIES IGA: <1 U/ML
GLIADIN ANTIBODIES IGG: <1 U/ML
GLUCOSE: 89 MG/DL (ref 65–99)
HBA1C MFR BLD: 5.5 % (ref 4.4–6.4)
HCT VFR BLD CALC: 41.3 % (ref 35–47)
HDLC SERPL-MCNC: 51 MG/DL
HEMOGLOBIN: 14 G/DL (ref 11.7–15.5)
HIGH SENSITIVE C-REACTIVE PROTEIN: 0.23 MG/DL (ref 0–0.74)
HOMOCYSTINE, SERUM: 9.38 MCMOL/L (ref 3.36–20.44)
IRON SATURATION: 27 % SATURATION (ref 15–50)
IRON, SERUM: 90 UG/DL (ref 50–170)
LDL CHOLESTEROL CALCULATED: 136 MG/DL
LDL/HDL RATIO: 2.7
LH: 15.7 MIU/ML
LIPASE: 54 U/L (ref 11–82)
LYMPHOCYTE %: 36.7 %
MAGNESIUM: 2 MG/DL (ref 1.8–2.6)
MCH RBC QN AUTO: 30.9 PG (ref 27–34)
MCHC RBC AUTO-ENTMCNC: 33.9 G/DL (ref 32–36)
MCV RBC AUTO: 91 FL (ref 80–100)
MONOCYTES # BLD: 10.6 %
NEUTROPHILS RELATIVE PERCENT: 50.4 %
PARATHYROID HORMONE INTACT: 43 PG/ML (ref 12–88)
PDW BLD-RTO: 13.5 % (ref 11.5–15)
PLATELETS: 274 X10E9/L (ref 150–450)
PMV BLD AUTO: 8.7 FL (ref 7–12)
POTASSIUM SERPL-SCNC: 4 MMOL/L (ref 3.5–5)
PROGESTERONE LEVEL: 0.5 NG/ML
RBC: 4.53 X10E12/L (ref 3.8–5.2)
RHEUMATOID FACTOR: <10 IU/ML
SEDIMENTATION RATE, ERYTHROCYTE: 8 MM/H (ref 0–30)
SEX HORMONE BINDING GLOBULIN: 71.4 NMOL/L (ref 17.3–125)
SODIUM BLD-SCNC: 144 MMOL/L (ref 134–146)
T3 FREE: 2.65 PG/ML (ref 2.5–3.9)
T3 TOTAL: 123 NG/DL (ref 87–178)
T4 FREE: 0.56 NG/DL (ref 0.61–1.6)
T4 TOTAL: 6.2 UG/DL (ref 6.1–12.2)
TESTOSTERONE FREE: 2.1 PG/ML (ref 0.6–3.8)
TESTOSTERONE, LCMS: 20 NG/DL (ref 9–55)
THYROGLOBULIN: 3 NG/ML (ref 0–35)
THYROID PEROXIDASE ANTIBODY: <1 IU/ML
TISSUE TRANSGLUTAMINASE IGA: <1 U/ML
TOTAL IRON BINDING CAPACITY: 330 UG/DL (ref 250–425)
TOTAL PROTEIN: 7 G/DL (ref 6–8)
TRIGL SERPL-MCNC: 138 MG/DL (ref 27–150)
TSH SERPL DL<=0.05 MIU/L-ACNC: 2.26 UIU/ML (ref 0.49–4.67)
URIC ACID: 4 MG/DL (ref 2.6–7.2)
VITAMIN D 25-HYDROXY: 30.5 NG/ML (ref 30–100)
VLDLC SERPL CALC-MCNC: 28 MG/DL (ref 0–30)
WBC: 4.6 X10E9/L (ref 4–11)

## 2021-08-05 DIAGNOSIS — E03.4 HYPOTHYROIDISM DUE TO ACQUIRED ATROPHY OF THYROID: ICD-10-CM

## 2021-08-05 RX ORDER — LEVOTHYROXINE, LIOTHYRONINE 38; 9 UG/1; UG/1
TABLET ORAL
Qty: 30 TABLET | Refills: 0 | Status: SHIPPED | OUTPATIENT
Start: 2021-08-05 | End: 2021-11-17

## 2021-08-30 ENCOUNTER — TELEPHONE (OUTPATIENT)
Dept: FAMILY MEDICINE CLINIC | Age: 56
End: 2021-08-30

## 2021-08-30 NOTE — TELEPHONE ENCOUNTER
Patient came back from a 1 month trip. Said she cannot understand what he is recommending from the letter. Said she is too busy to take the time to review it. Wants to know if we can send her a list of what she needs to take? Please make sure this is ok-    This is what I understand:      Stop eating: corn, oats, wheat, barley,rye and soy products, they cause injury to your liver. Stop the DHEA as recommended by your oncologist, it is too high. Magnesium- take 3 tablets per day of a type you tolerate. Vitamin D- take 2,000 IU daily    Vitamin B 100 Time Released by [address] [facility]- 1 cap 3 times per day before each meal.    CVS Pharmaceutical Grade Fish oil- 1000 mg- 3 capsules before each meal, and one at bedtime, If OK with all your doctors and if not on a RX blood thinner. Adding magnesium and Vitamin D will lower your bone removal, total cholesterol, LDL and pain. If you have any questions regarding this list, please make a sooner appointment to discuss with Dr. Cm Villanueva.

## 2021-11-01 DIAGNOSIS — E03.4 HYPOTHYROIDISM DUE TO ACQUIRED ATROPHY OF THYROID: ICD-10-CM

## 2021-11-01 RX ORDER — LEVOTHYROXINE AND LIOTHYRONINE 38; 9 UG/1; UG/1
TABLET ORAL
Qty: 30 TABLET | Refills: 1 | OUTPATIENT
Start: 2021-11-01

## 2021-11-01 NOTE — TELEPHONE ENCOUNTER
Date of last visit:  6/18/2020  Date of next visit:  12/8/2021    Requested Prescriptions     Pending Prescriptions Disp Refills    thyroid (NP THYROID) 60 MG tablet 30 tablet 1     Sig: Take 1 tablet by mouth once daily

## 2021-11-17 DIAGNOSIS — E03.4 HYPOTHYROIDISM DUE TO ACQUIRED ATROPHY OF THYROID: ICD-10-CM

## 2021-11-17 RX ORDER — LEVOTHYROXINE, LIOTHYRONINE 38; 9 UG/1; UG/1
TABLET ORAL
Qty: 30 TABLET | Refills: 0 | Status: SHIPPED | OUTPATIENT
Start: 2021-11-17 | End: 2022-01-10

## 2021-11-17 NOTE — TELEPHONE ENCOUNTER
Date of last visit:  6/18/2021  Date of next visit:  12/8/2021    Requested Prescriptions     Pending Prescriptions Disp Refills    NP THYROID 60 MG tablet [Pharmacy Med Name: NP Thyroid 60 MG Oral Tablet] 30 tablet 0     Sig: Take 1 tablet by mouth once daily

## 2022-01-18 ENCOUNTER — OFFICE VISIT (OUTPATIENT)
Dept: FAMILY MEDICINE CLINIC | Age: 57
End: 2022-01-18

## 2022-01-18 VITALS
DIASTOLIC BLOOD PRESSURE: 72 MMHG | SYSTOLIC BLOOD PRESSURE: 120 MMHG | RESPIRATION RATE: 16 BRPM | HEIGHT: 65 IN | WEIGHT: 173.5 LBS | BODY MASS INDEX: 28.91 KG/M2 | HEART RATE: 88 BPM | TEMPERATURE: 95.3 F

## 2022-01-18 DIAGNOSIS — M77.8 LEFT SHOULDER TENDONITIS: Primary | ICD-10-CM

## 2022-01-18 PROCEDURE — 20610 DRAIN/INJ JOINT/BURSA W/O US: CPT | Performed by: FAMILY MEDICINE

## 2022-01-18 RX ORDER — METHYLPREDNISOLONE ACETATE 80 MG/ML
80 INJECTION, SUSPENSION INTRA-ARTICULAR; INTRALESIONAL; INTRAMUSCULAR; SOFT TISSUE ONCE
Status: SHIPPED | OUTPATIENT
Start: 2022-01-18

## 2022-01-18 ASSESSMENT — ENCOUNTER SYMPTOMS
CONSTIPATION: 0
SHORTNESS OF BREATH: 0
SINUS PRESSURE: 0

## 2022-01-18 NOTE — PROGRESS NOTES
Jack Justin (:  1965) is a 60 y.o. female,Established patient, here for evaluation of the following chief complaint(s):  Hypothyroidism         ASSESSMENT/PLAN:      Diagnosis Orders   1. Left shoulder tendonitis  XR SHOULDER LEFT (MIN 2 VIEWS)    47463 - IA DRAIN/INJECT LARGE JOINT/BURSA    methylPREDNISolone acetate (DEPO-MEDROL) injection 80 mg       See me in a year    Subjective   SUBJECTIVE/OBJECTIVE:  HPI  1. There has been some progressive soreness to the left shoulder the past 3 months  2. No known injury. No past episodes  3. She tried some aleve with some help. Review of Systems   Constitutional: Negative for fatigue. HENT: Negative for sinus pressure. Eyes: Negative for visual disturbance. Respiratory: Negative for shortness of breath. Cardiovascular: Negative for chest pain. Gastrointestinal: Negative for constipation. Genitourinary: Negative. Musculoskeletal: Positive for arthralgias and myalgias. Skin: Negative for rash. Neurological: Negative for headaches. The patient's medications, allergies, past medical problems, surgical, social, and family histories were reviewed and updated as needed. Objective   Physical Exam  Constitutional:       General: She is not in acute distress. Appearance: She is well-developed. HENT:      Head: Normocephalic and atraumatic. Right Ear: External ear normal.      Left Ear: External ear normal.      Nose: Nose normal.      Mouth/Throat:      Pharynx: No oropharyngeal exudate. Eyes:      General: No scleral icterus. Conjunctiva/sclera: Conjunctivae normal.   Neck:      Thyroid: No thyromegaly. Vascular: No carotid bruit. Trachea: No tracheal deviation. Cardiovascular:      Rate and Rhythm: Normal rate and regular rhythm. Heart sounds: Normal heart sounds. Pulmonary:      Effort: Pulmonary effort is normal.      Breath sounds: Normal breath sounds.    Abdominal:      General: Bowel sounds are normal.      Palpations: Abdomen is soft. There is no mass. Musculoskeletal:      Cervical back: Neck supple. Comments: The left shoulder has good elevation to horizontal but quite limited to rotation and further elevation.  5/5 and radial pulse 4/4   Lymphadenopathy:      Cervical: No cervical adenopathy. Skin:     General: Skin is warm and dry. Neurological:      Mental Status: She is alert and oriented to person, place, and time. Psychiatric:         Behavior: Behavior normal.     Blood pressure 120/72, pulse 88, temperature 95.3 °F (35.2 °C), temperature source Skin, resp. rate 16, height 5' 5\" (1.651 m), weight 173 lb 8 oz (78.7 kg), not currently breastfeeding. A steroid injection was performed at the posterior left shoulder using 5 ml 1% plain Lidocaine and 80 mg of depomedrol. This was well tolerated. she noticed an immediate reduction in the shoulder pain          An electronic signature was used to authenticate this note.     --Ralph Rust MD

## 2022-03-09 ENCOUNTER — TELEPHONE (OUTPATIENT)
Dept: ONCOLOGY | Age: 57
End: 2022-03-09

## 2022-05-17 ENCOUNTER — OFFICE VISIT (OUTPATIENT)
Dept: ONCOLOGY | Age: 57
End: 2022-05-17
Payer: COMMERCIAL

## 2022-05-17 ENCOUNTER — HOSPITAL ENCOUNTER (OUTPATIENT)
Dept: INFUSION THERAPY | Age: 57
Discharge: HOME | End: 2022-05-17
Payer: COMMERCIAL

## 2022-05-17 VITALS
OXYGEN SATURATION: 96 % | SYSTOLIC BLOOD PRESSURE: 139 MMHG | DIASTOLIC BLOOD PRESSURE: 78 MMHG | HEIGHT: 65 IN | RESPIRATION RATE: 16 BRPM | WEIGHT: 176.8 LBS | BODY MASS INDEX: 29.46 KG/M2 | HEART RATE: 76 BPM | TEMPERATURE: 98.6 F

## 2022-05-17 VITALS
SYSTOLIC BLOOD PRESSURE: 139 MMHG | OXYGEN SATURATION: 96 % | RESPIRATION RATE: 16 BRPM | DIASTOLIC BLOOD PRESSURE: 78 MMHG | HEART RATE: 76 BPM | TEMPERATURE: 98.6 F

## 2022-05-17 DIAGNOSIS — Z12.31 VISIT FOR SCREENING MAMMOGRAM: Primary | ICD-10-CM

## 2022-05-17 DIAGNOSIS — N64.59 INVERSION OF LEFT NIPPLE: ICD-10-CM

## 2022-05-17 PROCEDURE — 99211 OFF/OP EST MAY X REQ PHY/QHP: CPT

## 2022-05-17 PROCEDURE — 99213 OFFICE O/P EST LOW 20 MIN: CPT | Performed by: INTERNAL MEDICINE

## 2022-05-17 NOTE — PROGRESS NOTES
[facility], [address] [facility] [address]  [address] [address]  Dept: [phone]  Dept Fax: [phone]: [phone]      Visit Date:5/17/2022     Gwen Otto is a 59 y.o. female who presents today for:   Chief Complaint   Patient presents with    Follow-up     Visit for screening mammogram        HPI:   Gwen Otto is a 59 y.o. female followed  for history of abnormal mammogram.   She was referred to medical oncology to discuss findings from mammogram. Since the patient noticed some inversion of her left nipple associated with skin thickening around the left nipple, she had the mammogram on September 5, 2017 that showed: There is some left nipple retraction and skin thickening at the    left nipple noted with no definite mammographic correlate. A    retroareolar left breast diagnostic ultrasound is recommended.     The mass in the left breast needs additional evaluation.       On September 11, 2017 she had left a breast ultrasound that showed no sonographic correlate for nipple inversion.  No definite skin thickening was seen on ultrasound.  The overall report was read as BI-RADS Category 3, probably benign and six-month follow up of the left breast mammogram was recommended to show stability. She had a diagnostic left mammogram completed on 3/26/18 that revealed no mammographic abnormality to explain the patient's nipple inversion. An ultrasound of the left breast on 3/26/18 showed no sonographic correlate for nipple inversion, no evidence of malignancy. Ductal ectasia appears benign. She received recommendation to return to annual screening mammogram.        Interval History 5/29/2022:   The patient is here for follow up evaluation. She was seen one year ago. Mammogram completed on 06/01/2021 was benign. It showed BI-RAD2, stable appearing left nipple inversion, no mammographic evidence of malignancy. She is due for annual screening mammogram next month.   Patient states she has been feeling well over the last year. She denies any changes in her overall health. She denies ED visits or hospitalizations. She denies any changes in her breasts. She continues to complete monthly self breast exams and denies any pain, nipple discharge or skin changes. She has chronic left nipple inversion which is unchanged in the last year. PMH, SH, and FH:  I reviewed the patients medication list and allergy list as noted on the electronic medical record. The PMH, SH and FH were also reviewed as noted on the EMR. Review of Systems:   Review of Systems   Pertinent review of systems noted in HPI, all other ROS negative. Objective:   Physical Exam   /78 (Site: Left Upper Arm, Position: Sitting, Cuff Size: Medium Adult)   Pulse 76   Temp 98.6 °F (37 °C) (Oral)   Resp 16   Ht 5' 5\" (1.651 m)   Wt 176 lb 12.8 oz (80.2 kg)   SpO2 96%   BMI 29.42 kg/m²    General appearance: No apparent distress, well developed and cooperative. HEENT: Pupils equal, round, and reactive to light. Conjunctivae/corneas clear. Neck: Supple, with full range of motion. Trachea midline. Respiratory:  Normal respiratory effort. Chest: left breast with nipple inversion noted, no palpable abnormalities. No discharge present. Abdomen: Soft, non-distended. Musculoskeletal: No clubbing, cyanosis or edema bilaterally. Ambulates in office without difficulty. Skin: Skin color, texture, turgor normal.  No visible rashes or lesions. Neurologic:  Neurovascularly intact without any focal sensory/motor deficits. Psychiatric: Alert and oriented      Imaging Studies and Labs:   Mammogram 06/21/2021  Narrative       IMPRESSION: Mammogram BI-RADS: 2: Benign           1. There is stable appearing left nipple inversion. There is no    mammographic evidence of malignancy.        A 1 year screening mammogram is recommended. Assessment and Plan:   1.  History of Left Breast Nipple Inversion   Left Breast mammogram completed 3/26/18 showed no mammographic abnormality. Left breast ultrasound revealed benign appearing duct ectasia in the left breast central to nipple anterior depth. No masses to correspond. Annual mammogram completed on 06/21/2021 showed stable appearing left nipple inversion, no mammographic evidence of malignancy. She denies any changes over the last year. Breast examination completed today showed no abnormal findings with chronic left nipple inversion.    -Will schedule for screening mammogram to be completed next month   -Patient instructed to continue self breast exams   -Instructed to call with any breast changes or concerns      Return if symptoms worsen or fail to improve. All patient questions answered. Pt voiced understanding. Patient agreed with treatment plan. Follow up as directed. Patient instructed to call for questions or concerns.           Electronically signed by   Sanjana Morelos MD

## 2022-08-10 ENCOUNTER — TELEPHONE (OUTPATIENT)
Dept: FAMILY MEDICINE CLINIC | Age: 57
End: 2022-08-10

## 2022-08-10 RX ORDER — SCOLOPAMINE TRANSDERMAL SYSTEM 1 MG/1
1 PATCH, EXTENDED RELEASE TRANSDERMAL
Qty: 2 PATCH | Refills: 1 | Status: SHIPPED | OUTPATIENT
Start: 2022-08-10

## 2022-08-10 NOTE — TELEPHONE ENCOUNTER
Pt called stating that she is going on a boat for vacation. Pt stated that she is going to be on the boat 3-4 days. She is asking for motion sickness patches. She is leaving Friday.     [facility] on [address] may be reached [phone]

## 2022-10-31 ENCOUNTER — HOSPITAL ENCOUNTER (OUTPATIENT)
Dept: WOMENS IMAGING | Age: 57
Discharge: HOME | End: 2022-10-31
Payer: COMMERCIAL

## 2022-10-31 DIAGNOSIS — Z12.31 VISIT FOR SCREENING MAMMOGRAM: ICD-10-CM

## 2022-10-31 PROCEDURE — 77067 SCR MAMMO BI INCL CAD: CPT

## 2023-01-16 ENCOUNTER — TELEPHONE (OUTPATIENT)
Dept: FAMILY MEDICINE CLINIC | Age: 58
End: 2023-01-16

## 2023-01-16 DIAGNOSIS — B34.9 VIRAL SYNDROME: Primary | ICD-10-CM

## 2023-01-16 DIAGNOSIS — B34.9 VIRAL SYNDROME: ICD-10-CM

## 2023-01-16 RX ORDER — GUAIFENESIN AND CODEINE PHOSPHATE 100; 10 MG/5ML; MG/5ML
10 SOLUTION ORAL EVERY 4 HOURS PRN
Qty: 160 ML | Refills: 0 | Status: SHIPPED | OUTPATIENT
Start: 2023-01-16 | End: 2023-01-20

## 2023-01-16 RX ORDER — OSELTAMIVIR PHOSPHATE 75 MG/1
75 CAPSULE ORAL 2 TIMES DAILY
Qty: 10 CAPSULE | Refills: 0 | Status: SHIPPED | OUTPATIENT
Start: 2023-01-16 | End: 2023-01-21

## 2023-01-16 NOTE — TELEPHONE ENCOUNTER
Pt called to req an cough medication and atb she is having coughing chest congestion body aches fever she stated she is not coughing any mucus up     Send to [address] please call once addressed [phone]

## 2023-01-18 RX ORDER — GUAIFENESIN AND CODEINE PHOSPHATE 100; 10 MG/5ML; MG/5ML
10 SOLUTION ORAL EVERY 4 HOURS PRN
Qty: 160 ML | Refills: 0 | OUTPATIENT
Start: 2023-01-18 | End: 2023-01-22

## 2023-01-18 NOTE — TELEPHONE ENCOUNTER
Date of last visit:  1/18/2022  Date of next visit:  Visit date not found    Requested Prescriptions     Pending Prescriptions Disp Refills    guaiFENesin-codeine (CHERATUSSIN AC) 100-10 MG/5ML syrup 160 mL 0     Sig: Take 10 mLs by mouth every 4 hours as needed for Cough for up to 4 days. Max Daily Amount: 60 mLs         Phoned in script to Pharmacy per verbal order from Dr Kelvin Dowling.

## 2023-01-18 NOTE — TELEPHONE ENCOUNTER
Patient called stating that [facility] does not have the Cheratussin but [facility] on [address] does.     Please send to [facility] on [address].

## 2023-09-26 ENCOUNTER — TELEPHONE (OUTPATIENT)
Dept: FAMILY MEDICINE CLINIC | Age: 58
End: 2023-09-26

## 2023-09-26 ENCOUNTER — OFFICE VISIT (OUTPATIENT)
Dept: FAMILY MEDICINE CLINIC | Age: 58
End: 2023-09-26

## 2023-09-26 VITALS
HEART RATE: 72 BPM | RESPIRATION RATE: 16 BRPM | WEIGHT: 175.5 LBS | DIASTOLIC BLOOD PRESSURE: 80 MMHG | SYSTOLIC BLOOD PRESSURE: 128 MMHG | HEIGHT: 65 IN | BODY MASS INDEX: 29.24 KG/M2

## 2023-09-26 DIAGNOSIS — R79.89 INCREASED PROLACTIN LEVEL: ICD-10-CM

## 2023-09-26 DIAGNOSIS — Z00.00 WELL ADULT EXAM: Primary | ICD-10-CM

## 2023-09-26 DIAGNOSIS — E03.4 HYPOTHYROIDISM DUE TO ACQUIRED ATROPHY OF THYROID: ICD-10-CM

## 2023-09-26 DIAGNOSIS — F34.1 PERSISTENT DEPRESSIVE DISORDER: ICD-10-CM

## 2023-09-26 PROCEDURE — 99396 PREV VISIT EST AGE 40-64: CPT | Performed by: FAMILY MEDICINE

## 2023-09-26 RX ORDER — METHYLPHENIDATE HYDROCHLORIDE 10 MG/1
10 TABLET ORAL DAILY
Qty: 21 TABLET | Refills: 0 | Status: SHIPPED | OUTPATIENT
Start: 2023-09-26 | End: 2023-10-17

## 2023-09-26 SDOH — ECONOMIC STABILITY: INCOME INSECURITY: HOW HARD IS IT FOR YOU TO PAY FOR THE VERY BASICS LIKE FOOD, HOUSING, MEDICAL CARE, AND HEATING?: NOT HARD AT ALL

## 2023-09-26 SDOH — ECONOMIC STABILITY: FOOD INSECURITY: WITHIN THE PAST 12 MONTHS, THE FOOD YOU BOUGHT JUST DIDN'T LAST AND YOU DIDN'T HAVE MONEY TO GET MORE.: NEVER TRUE

## 2023-09-26 SDOH — ECONOMIC STABILITY: HOUSING INSECURITY
IN THE LAST 12 MONTHS, WAS THERE A TIME WHEN YOU DID NOT HAVE A STEADY PLACE TO SLEEP OR SLEPT IN A SHELTER (INCLUDING NOW)?: NO

## 2023-09-26 SDOH — ECONOMIC STABILITY: FOOD INSECURITY: WITHIN THE PAST 12 MONTHS, YOU WORRIED THAT YOUR FOOD WOULD RUN OUT BEFORE YOU GOT MONEY TO BUY MORE.: NEVER TRUE

## 2023-09-26 ASSESSMENT — PATIENT HEALTH QUESTIONNAIRE - PHQ9
6. FEELING BAD ABOUT YOURSELF - OR THAT YOU ARE A FAILURE OR HAVE LET YOURSELF OR YOUR FAMILY DOWN: 0
10. IF YOU CHECKED OFF ANY PROBLEMS, HOW DIFFICULT HAVE THESE PROBLEMS MADE IT FOR YOU TO DO YOUR WORK, TAKE CARE OF THINGS AT HOME, OR GET ALONG WITH OTHER PEOPLE: 0
SUM OF ALL RESPONSES TO PHQ QUESTIONS 1-9: 7
7. TROUBLE CONCENTRATING ON THINGS, SUCH AS READING THE NEWSPAPER OR WATCHING TELEVISION: 0
2. FEELING DOWN, DEPRESSED OR HOPELESS: 2
SUM OF ALL RESPONSES TO PHQ QUESTIONS 1-9: 7
4. FEELING TIRED OR HAVING LITTLE ENERGY: 2
SUM OF ALL RESPONSES TO PHQ9 QUESTIONS 1 & 2: 2
SUM OF ALL RESPONSES TO PHQ QUESTIONS 1-9: 7
9. THOUGHTS THAT YOU WOULD BE BETTER OFF DEAD, OR OF HURTING YOURSELF: 0
1. LITTLE INTEREST OR PLEASURE IN DOING THINGS: 0
5. POOR APPETITE OR OVEREATING: 1
8. MOVING OR SPEAKING SO SLOWLY THAT OTHER PEOPLE COULD HAVE NOTICED. OR THE OPPOSITE, BEING SO FIGETY OR RESTLESS THAT YOU HAVE BEEN MOVING AROUND A LOT MORE THAN USUAL: 0
3. TROUBLE FALLING OR STAYING ASLEEP: 2
SUM OF ALL RESPONSES TO PHQ QUESTIONS 1-9: 7

## 2023-09-26 ASSESSMENT — ENCOUNTER SYMPTOMS
SINUS PRESSURE: 0
SHORTNESS OF BREATH: 0
CONSTIPATION: 0

## 2023-09-26 NOTE — PROGRESS NOTES
William Galeana (:  1965) is a 59 y.o. female,Established patient, here for evaluation of the following chief complaint(s):  Wellness Program         ASSESSMENT/PLAN:   Diagnosis Orders   1. Well adult exam        2. Increased prolactin level  Prolactin      3. Hypothyroidism due to acquired atrophy of thyroid  TSH      4. Persistent depressive disorder  CBC with Auto Differential    Comprehensive Metabolic Panel, Fasting    Lipid, Fasting    TSH    Vitamin D 25 Hydroxy    Vitamin B12    Prolactin    methylphenidate (RITALIN) 10 MG tablet           Do the fasting labs soon  See me in about 3 weeks    Subjective   SUBJECTIVE/OBJECTIVE:  HPI  Well Adult Physical   Patient here for a comprehensive physical exam.The patient reports problems - she states a long history of ADD. She remembers being on ritalin in the past that helped. She has been depressed for several months. She will cry, it can be hard to get to sleep, the appetite is increased. Nothing sounds fun. She and her  have retired and that seemed to make it worse. She has been doing some online therapy. No smoking, drinking or other drugs. Do you take any herbs or supplements that were not prescribed by a doctor? yes Are you taking calcium supplements? yes Are you taking aspirin daily? no   History:  She sees the GYN    Review of Systems   Constitutional:  Positive for appetite change. Negative for fatigue. HENT:  Negative for sinus pressure. Eyes:  Negative for visual disturbance. Respiratory:  Negative for shortness of breath. Cardiovascular:  Negative for chest pain. Gastrointestinal:  Negative for constipation. Genitourinary: Negative. Musculoskeletal:  Negative for arthralgias. Skin:  Negative for rash. Neurological:  Negative for headaches. Psychiatric/Behavioral:  Positive for sleep disturbance. The patient is nervous/anxious.     The patient's medications, allergies, past medical problems, surgical, social, and

## 2023-09-26 NOTE — TELEPHONE ENCOUNTER
Pt called stating pharmacy cannot get the RX Ritalin in until another 3 weeks. Pt will schedule back here once she gets the RX.

## 2024-05-17 ENCOUNTER — TELEPHONE (OUTPATIENT)
Dept: FAMILY MEDICINE CLINIC | Age: 59
End: 2024-05-17

## 2024-05-17 NOTE — TELEPHONE ENCOUNTER
Pt called to re an medication for motion sickness due to going  on a boat next week    Please send to [facility] [place]

## 2024-05-20 RX ORDER — SCOLOPAMINE TRANSDERMAL SYSTEM 1 MG/1
1 PATCH, EXTENDED RELEASE TRANSDERMAL
Qty: 2 PATCH | Refills: 0 | Status: SHIPPED | OUTPATIENT
Start: 2024-05-20

## 2024-07-11 ENCOUNTER — TELEPHONE (OUTPATIENT)
Dept: FAMILY MEDICINE CLINIC | Age: 59
End: 2024-07-11

## 2024-07-11 RX ORDER — SCOLOPAMINE TRANSDERMAL SYSTEM 1 MG/1
1 PATCH, EXTENDED RELEASE TRANSDERMAL
Qty: 2 PATCH | Refills: 0 | Status: SHIPPED | OUTPATIENT
Start: 2024-07-11

## 2024-07-11 NOTE — TELEPHONE ENCOUNTER
Pt called to req an Rx for the following    Motion sickness patches     Pt is asking for this to be set in today    Please send to [facility] [address]    Please call pt once addressed [phone]

## 2024-08-13 DIAGNOSIS — T75.3XXA MOTION SICKNESS, INITIAL ENCOUNTER: Primary | ICD-10-CM

## 2024-08-13 RX ORDER — SCOLOPAMINE TRANSDERMAL SYSTEM 1 MG/1
1 PATCH, EXTENDED RELEASE TRANSDERMAL
Qty: 4 PATCH | Refills: 0 | Status: SHIPPED | OUTPATIENT
Start: 2024-08-13

## 2024-11-01 ENCOUNTER — TELEPHONE (OUTPATIENT)
Dept: FAMILY MEDICINE CLINIC | Age: 59
End: 2024-11-01

## 2024-11-01 NOTE — TELEPHONE ENCOUNTER
Pt called she is having pain in the upper stomach (gastritis), can we call something in for it?    [facility] [place]    Please advise after addressed

## 2024-11-01 NOTE — TELEPHONE ENCOUNTER
She should really be seen before we could Rx an med. She could use some liquid antacid over the counter till she can get in.

## 2025-01-27 ENCOUNTER — HOSPITAL ENCOUNTER (EMERGENCY)
Age: 60
Discharge: HOME | End: 2025-01-27
Payer: COMMERCIAL

## 2025-01-27 VITALS
SYSTOLIC BLOOD PRESSURE: 130 MMHG | HEIGHT: 65 IN | BODY MASS INDEX: 29.16 KG/M2 | OXYGEN SATURATION: 96 % | DIASTOLIC BLOOD PRESSURE: 78 MMHG | TEMPERATURE: 97.4 F | WEIGHT: 175 LBS | HEART RATE: 99 BPM | RESPIRATION RATE: 18 BRPM

## 2025-01-27 DIAGNOSIS — J10.1 INFLUENZA A: Primary | ICD-10-CM

## 2025-01-27 LAB
FLUAV AG SPEC QL: POSITIVE
FLUBV AG SPEC QL: NEGATIVE
S PYO AG THROAT QL: NEGATIVE
SARS-COV-2 RDRP RESP QL NAA+PROBE: NOT  DETECTED

## 2025-01-27 PROCEDURE — 99213 OFFICE O/P EST LOW 20 MIN: CPT

## 2025-01-27 PROCEDURE — 87804 INFLUENZA ASSAY W/OPTIC: CPT

## 2025-01-27 PROCEDURE — 99214 OFFICE O/P EST MOD 30 MIN: CPT

## 2025-01-27 PROCEDURE — 87651 STREP A DNA AMP PROBE: CPT

## 2025-01-27 PROCEDURE — 87635 SARS-COV-2 COVID-19 AMP PRB: CPT

## 2025-01-27 RX ORDER — DEXTROMETHORPHAN HYDROBROMIDE AND PROMETHAZINE HYDROCHLORIDE 15; 6.25 MG/5ML; MG/5ML
5 SYRUP ORAL 4 TIMES DAILY PRN
Qty: 118 ML | Refills: 0 | Status: SHIPPED | OUTPATIENT
Start: 2025-01-27 | End: 2025-02-02

## 2025-01-27 ASSESSMENT — ENCOUNTER SYMPTOMS
DIARRHEA: 0
SORE THROAT: 1
ALLERGIC/IMMUNOLOGIC NEGATIVE: 1
VOMITING: 0
COUGH: 1
EYES NEGATIVE: 1
NAUSEA: 0
GASTROINTESTINAL NEGATIVE: 1

## 2025-01-27 ASSESSMENT — PAIN DESCRIPTION - FREQUENCY: FREQUENCY: INTERMITTENT

## 2025-01-27 ASSESSMENT — PAIN - FUNCTIONAL ASSESSMENT: PAIN_FUNCTIONAL_ASSESSMENT: 0-10

## 2025-01-27 ASSESSMENT — PAIN SCALES - GENERAL: PAINLEVEL_OUTOF10: 8

## 2025-01-27 ASSESSMENT — PAIN DESCRIPTION - INTENSITY: RATING_2: 6

## 2025-01-27 ASSESSMENT — PAIN DESCRIPTION - LOCATION: LOCATION: THROAT

## 2025-01-27 ASSESSMENT — PAIN DESCRIPTION - PAIN TYPE: TYPE: ACUTE PAIN

## 2025-01-27 ASSESSMENT — PAIN DESCRIPTION - DESCRIPTORS: DESCRIPTORS: ACHING

## 2025-01-27 NOTE — ED PROVIDER NOTES
[facility] URGENT CARE  Urgent Care Encounter       CHIEF COMPLAINT       Chief Complaint   Patient presents with    Cough    Nasal Congestion     Onset of symptoms 1/25/25    Ear Pain    Pharyngitis       Nurses Notes reviewed and I agree except as noted in the HPI.  HISTORY OF PRESENT ILLNESS   Erinn Thomas is a 59 y.o. female who presents to urgent care for cough, nasal congestion, bilateral ear pain and sore throat.  Symptoms started 2 days ago.  States took over-the-counter DayQuil NyQuil with little relief.  States sick contacts at home with daughter and grandson.  Denies fever, nausea, vomiting and diarrhea.    The history is provided by the patient. No  was used.       REVIEW OF SYSTEMS     Review of Systems   HENT:  Positive for congestion, ear pain (bilateral) and sore throat.    Eyes: Negative.    Respiratory:  Positive for cough.    Cardiovascular: Negative.    Gastrointestinal: Negative.  Negative for diarrhea, nausea and vomiting.   Endocrine: Negative.    Genitourinary: Negative.    Musculoskeletal: Negative.    Skin: Negative.    Allergic/Immunologic: Negative.    Neurological: Negative.    Hematological: Negative.    Psychiatric/Behavioral: Negative.         PAST MEDICAL HISTORY         Diagnosis Date    ADHD (attention deficit hyperactivity disorder)     Hypothyroid 2006    Dr. Plunkett    Prolactin increased 2006    Dr. Plunkett       SURGICALHISTORY     Patient  has a past surgical history that includes Tonsillectomy and adenoidectomy (1974) and Tympanoplasty (1974 then 1979).    CURRENT MEDICATIONS       Previous Medications    SCOPOLAMINE (TRANSDERM SCOP, 1.5 MG,) TRANSDERMAL PATCH    Place 1 patch onto the skin every 72 hours    SCOPOLAMINE (TRANSDERM-SCOP) TRANSDERMAL PATCH    Place 1 patch onto the skin every 72 hours    SCOPOLAMINE (TRANSDERM-SCOP) TRANSDERMAL PATCH    Place 1 patch onto the skin every 72 hours       ALLERGIES     Patient is has No Known  with new worsening symptoms.  Instructed to present to the emergent department for severe dyspnea, fever uncontrolled with acetaminophen, or other symptoms deemed emergent.  Patient is agreeable with the above plan and denies questions or concerns at this time.       PATIENT REFERRED TO:  Maikol Bernal MD  [address] [place] [address] / [facility] [address]      DISCHARGE MEDICATIONS:  New Prescriptions    PROMETHAZINE-DEXTROMETHORPHAN (PROMETHAZINE-DM) 6.25-15 MG/5ML SYRUP    Take 5 mLs by mouth 4 times daily as needed for Cough       Discontinued Medications    No medications on file       Current Discharge Medication List          RU Banks CNP    (Please note that portions of this note were completed with a voice recognition program. Efforts were made to edit the dictations but occasionally words are mis-transcribed.)          Marisa Delong APRN - CNP  01/27/25 [id number]

## 2025-01-27 NOTE — DISCHARGE INSTRUCTIONS
Medications as prescribed.  Increase fluid intake.  Can take over-the-counter Zyrtec and Flonase for congestion.  Can alternate over-the-counter Motrin and Tylenol as needed for body aches and pain.  Follow-up with family doctor in 3 to 5 days.  The emergency room for any difficulty breathing new or worsening symptoms.